# Patient Record
Sex: MALE | Race: WHITE | Employment: OTHER | URBAN - METROPOLITAN AREA
[De-identification: names, ages, dates, MRNs, and addresses within clinical notes are randomized per-mention and may not be internally consistent; named-entity substitution may affect disease eponyms.]

---

## 2019-04-15 ENCOUNTER — TELEPHONE (OUTPATIENT)
Dept: ORTHOPEDIC SURGERY | Age: 69
End: 2019-04-15

## 2019-04-15 ENCOUNTER — OFFICE VISIT (OUTPATIENT)
Dept: ORTHOPEDIC SURGERY | Age: 69
End: 2019-04-15
Payer: MEDICARE

## 2019-04-15 VITALS — BODY MASS INDEX: 29.95 KG/M2 | HEIGHT: 73 IN | WEIGHT: 226 LBS

## 2019-04-15 DIAGNOSIS — M17.12 PRIMARY OSTEOARTHRITIS OF LEFT KNEE: ICD-10-CM

## 2019-04-15 DIAGNOSIS — R52 PAIN: Primary | ICD-10-CM

## 2019-04-15 DIAGNOSIS — M17.11 OSTEOARTHRITIS OF RIGHT KNEE, UNSPECIFIED OSTEOARTHRITIS TYPE: ICD-10-CM

## 2019-04-15 PROCEDURE — 99213 OFFICE O/P EST LOW 20 MIN: CPT | Performed by: ORTHOPAEDIC SURGERY

## 2019-04-15 RX ORDER — ASPIRIN 325 MG
325 TABLET ORAL DAILY
COMMUNITY
End: 2021-11-11

## 2019-04-15 RX ORDER — MELOXICAM 15 MG/1
15 TABLET ORAL DAILY
Qty: 90 TABLET | Refills: 3 | Status: SHIPPED | OUTPATIENT
Start: 2019-04-15 | End: 2020-06-12

## 2019-04-15 NOTE — TELEPHONE ENCOUNTER
04/15/2019  EUFLEXXA  (SERIES OF 3)  RIGHT  KNEE. NO AUTHORIZATION REQUIRED. CAN BUY& BILL. PER MEDICARE GUIDELINES.  AP

## 2019-04-15 NOTE — PROGRESS NOTES
Chief Complaint    Knee Pain (L KNEE PAIN )      History of Present Illness:  Guy George is a 70 y.o. male. He has successfully had a right total knee arthroplasty and is here primarily for soreness and pain in his left knee. He has recently started playing pickle ball which has given his knee some soreness anterior and medial.  Pain level can be 7/10. Its described as a throbbing pain. It can last a few days and then quiet down. Rest and ice decreases the pain. Catching, or giving way. No night pain, rest pain, fever or chills. Pain Assessment  Location of Pain: Knee  Location Modifiers: Left  Severity of Pain: 7  Quality of Pain: Throbbing  Duration of Pain: A few days  Frequency of Pain: Intermittent  Aggravating Factors: Stairs, Exercise  Limiting Behavior: No  Relieving Factors: Rest  Work-Related Injury: No  Are there other pain locations you wish to document?: No    Medical History:  Patient's medications, allergies, past medical, surgical, social and family histories were reviewed and updated as appropriate. Review of Systems:  Pertinent items are noted in HPI  Review of systems reviewed from Patient History Form dated on April 15, 2019 and available in the patient's chart under the Media tab. Vital Signs:  Ht 6' 1\" (1.854 m)   Wt 226 lb (102.5 kg)   BMI 29.82 kg/m²     General Exam:   Constitutional: Patient is adequately groomed with no evidence of malnutrition  Mental Status: The patient is oriented to time, place and person. The patient's mood and affect are appropriate. Lymphatic: The lymphatic examination bilaterally reveals all areas to be without enlargement or induration. Knee Examination:    Inspection:  Genu varum left knee    Palpation:  Left knee has good ligamentous stability in all directions. Mild patellofemoral crepitus with moderate retropatellar and peripatellar medial facet tenderness.   He does have anteromedial joint line tenderness but no lateral joint line tenderness. Adolfo sign negative. Fullness in the popliteal region but no obvious masses. Range of Motion:  He comes pretty close to full extension but flexes only to about 100°    Strength:  Quadriceps strength 4/5 left    Special Tests:  Negative Homans sign left    Skin: There are no rashes, ulcerations or lesions. Gait: Mild antalgic gait left    Reflex intact    Additional Comments:       Additional Examinations:         Right Lower Extremity: Examination of the right lower extremity does not show any tenderness, deformity or injury. Range of motion is unremarkable. There is no gross instability. There are no rashes, ulcerations or lesions. Strength and tone are normal.  Left Lower Extremity: Examination of the left lower extremity does not show any tenderness, deformity or injury. Range of motion is unremarkable. There is no gross instability. There are no rashes, ulcerations or lesions. Strength and tone are normal.    Radiology:     X-rays obtained and reviewed in office:  Views standing AP, PA, lateral and sunrise  Location left knee  Impression advanced close to bone-on-bone medial compartment primary osteoarthritis with intermediate to advanced primary patellofemoral osteoarthritis left knee         Assessment :  Close to bone-on-bone medial compartment and advanced patellofemoral primary osteoarthritis left knee    Impression:  Encounter Diagnoses   Name Primary?  Pain Yes    Osteoarthritis of right knee, unspecified osteoarthritis type     Primary osteoarthritis of left knee        Office Procedures:  Orders Placed This Encounter   Procedures    XR KNEE LEFT (MIN 4 VIEWS)     Standing Status:   Future     Number of Occurrences:   1     Standing Expiration Date:   5/15/2019    EUFLEXXA INJ PER DOSE     Standing Status:   Future     Standing Expiration Date:   4/14/2020       Treatment Plan: We talked him at length about the options of treatment.   At this point trying another series of viscous supplementation which had worked in the past some years ago would be recommended. We've also recommended strengthening exercises. We recommended the use of ice. We recommended trying meloxicam 15 mg by mouth daily with food and obvious precautions. We will get permission for Visco supplementation go ahead and start that when he returns from his trip down [facility].

## 2019-04-17 ENCOUNTER — TELEPHONE (OUTPATIENT)
Dept: ORTHOPEDIC SURGERY | Age: 69
End: 2019-04-17

## 2019-04-17 NOTE — TELEPHONE ENCOUNTER
I left a message on the patients home machine to call and make an appointment for the approved knee injections.

## 2019-05-06 ENCOUNTER — OFFICE VISIT (OUTPATIENT)
Dept: ORTHOPEDIC SURGERY | Age: 69
End: 2019-05-06
Payer: MEDICARE

## 2019-05-06 DIAGNOSIS — M17.12 PRIMARY OSTEOARTHRITIS OF LEFT KNEE: Primary | ICD-10-CM

## 2019-05-06 PROCEDURE — 20610 DRAIN/INJ JOINT/BURSA W/O US: CPT | Performed by: ORTHOPAEDIC SURGERY

## 2019-05-06 NOTE — PROGRESS NOTES
The patient returns today for their first Euflexxa injection to the left knee for diagnosis of osteoarthritis. . The risks, benefits, and complications of the injections were again discussed in detail with the patient. The risks discussed included but are not limited to infection, skin reactions, hot swollen joints, and anaphylaxis. The patient gave verbal informed consent for the injection. The patient skin was prepped with iodine and sterile 4x4 gauze pad and the left knee joint was injected with 2 ml of Euflexxa intra-articularly under sterile conditions  into the supra-lateral pouch. The patient tolerated the injection reasonably well. The patient was given instructions to ice the left knee and avoid strenuous activities for 24-48 hours. The patient was instructed to call the office immediately if there is increased pain, redness, warmth, fever, or chills. We will see the patient back in [two weeks] for their second injection.

## 2019-05-13 ENCOUNTER — OFFICE VISIT (OUTPATIENT)
Dept: ORTHOPEDIC SURGERY | Age: 69
End: 2019-05-13
Payer: MEDICARE

## 2019-05-13 VITALS
DIASTOLIC BLOOD PRESSURE: 80 MMHG | HEIGHT: 73 IN | WEIGHT: 225.97 LBS | BODY MASS INDEX: 29.95 KG/M2 | HEART RATE: 76 BPM | SYSTOLIC BLOOD PRESSURE: 124 MMHG

## 2019-05-13 DIAGNOSIS — M25.562 CHRONIC PAIN OF LEFT KNEE: ICD-10-CM

## 2019-05-13 DIAGNOSIS — M17.12 PRIMARY OSTEOARTHRITIS OF LEFT KNEE: Primary | ICD-10-CM

## 2019-05-13 DIAGNOSIS — G89.29 CHRONIC PAIN OF LEFT KNEE: ICD-10-CM

## 2019-05-13 PROCEDURE — 20610 DRAIN/INJ JOINT/BURSA W/O US: CPT | Performed by: FAMILY MEDICINE

## 2019-05-13 PROCEDURE — 99999 PR OFFICE/OUTPT VISIT,PROCEDURE ONLY: CPT | Performed by: FAMILY MEDICINE

## 2019-05-13 RX ORDER — HYALURONATE SODIUM 10 MG/ML
20 SYRINGE (ML) INTRAARTICULAR ONCE
Status: COMPLETED | OUTPATIENT
Start: 2019-05-13 | End: 2019-05-13

## 2019-05-13 RX ADMIN — Medication 20 MG: at 09:46

## 2019-05-13 NOTE — PROGRESS NOTES
The patient returns today for their 2nd Euflexxa injection to the left knee for diagnosis of osteoarthritis. . The risks, benefits, and complications of the injections were again discussed in detail with the patient. The risks discussed included but are not limited to infection, skin reactions, hot swollen joints, and anaphylaxis. The patient gave verbal informed consent for the injection. The patient skin was prepped with iodine and sterile 4x4 gauze pad and the left knee joint was injected with 2 ml of Euflexxa intra-articularly under sterile conditions  into the supra-lateral pouch. The patient tolerated the injection reasonably well. The patient was given instructions to ice the left knee and avoid strenuous activities for 24-48 hours. The patient was instructed to call the office immediately if there is increased pain, redness, warmth, fever, or chills. We will see the patient back in [one weeks] for their final left knee Euflexxa injection.

## 2019-05-20 ENCOUNTER — OFFICE VISIT (OUTPATIENT)
Dept: ORTHOPEDIC SURGERY | Age: 69
End: 2019-05-20
Payer: MEDICARE

## 2019-05-20 DIAGNOSIS — M25.562 CHRONIC PAIN OF LEFT KNEE: ICD-10-CM

## 2019-05-20 DIAGNOSIS — G89.29 CHRONIC PAIN OF LEFT KNEE: ICD-10-CM

## 2019-05-20 DIAGNOSIS — M17.12 PRIMARY OSTEOARTHRITIS OF LEFT KNEE: Primary | ICD-10-CM

## 2019-05-20 PROCEDURE — 99999 PR OFFICE/OUTPT VISIT,PROCEDURE ONLY: CPT | Performed by: ORTHOPAEDIC SURGERY

## 2019-05-20 PROCEDURE — 20610 DRAIN/INJ JOINT/BURSA W/O US: CPT | Performed by: ORTHOPAEDIC SURGERY

## 2019-05-20 NOTE — PROGRESS NOTES
The patient returns today for their third Euflexxa injection to the left knee for diagnosis of osteoarthritis. . The risks, benefits, and complications of the injections were again discussed in detail with the patient. The risks discussed included but are not limited to infection, skin reactions, hot swollen joints, and anaphylaxis. The patient gave verbal informed consent for the injection. The patient skin was prepped with iodine and sterile 4x4 gauze pad and the left knee joint was injected with 2 ml of Euflexxa intra-articularly under sterile conditions  into the supra-lateral pouch. The patient tolerated the injection reasonably well. The patient was given instructions to ice the left knee and avoid strenuous activities for 24-48 hours. The patient was instructed to call the office immediately if there is increased pain, redness, warmth, fever, or chills. We will see the patient back in 3-months for a follow-up.

## 2019-05-20 NOTE — PROGRESS NOTES
Product Euflexxa    [facility]#[id number]     Lot# [id number]    Exp 4/14/2020    Site L KNEE

## 2020-06-12 ENCOUNTER — OFFICE VISIT (OUTPATIENT)
Dept: ORTHOPEDIC SURGERY | Age: 70
End: 2020-06-12
Payer: MEDICARE

## 2020-06-12 VITALS — BODY MASS INDEX: 29.16 KG/M2 | HEIGHT: 73 IN | WEIGHT: 220 LBS

## 2020-06-12 PROCEDURE — G8427 DOCREV CUR MEDS BY ELIG CLIN: HCPCS | Performed by: PHYSICAL MEDICINE & REHABILITATION

## 2020-06-12 PROCEDURE — 1123F ACP DISCUSS/DSCN MKR DOCD: CPT | Performed by: PHYSICAL MEDICINE & REHABILITATION

## 2020-06-12 PROCEDURE — 4040F PNEUMOC VAC/ADMIN/RCVD: CPT | Performed by: PHYSICAL MEDICINE & REHABILITATION

## 2020-06-12 PROCEDURE — 1036F TOBACCO NON-USER: CPT | Performed by: PHYSICAL MEDICINE & REHABILITATION

## 2020-06-12 PROCEDURE — G8417 CALC BMI ABV UP PARAM F/U: HCPCS | Performed by: PHYSICAL MEDICINE & REHABILITATION

## 2020-06-12 PROCEDURE — 99203 OFFICE O/P NEW LOW 30 MIN: CPT | Performed by: PHYSICAL MEDICINE & REHABILITATION

## 2020-06-12 PROCEDURE — 3017F COLORECTAL CA SCREEN DOC REV: CPT | Performed by: PHYSICAL MEDICINE & REHABILITATION

## 2020-06-12 PROCEDURE — 96372 THER/PROPH/DIAG INJ SC/IM: CPT | Performed by: PHYSICAL MEDICINE & REHABILITATION

## 2020-06-12 RX ORDER — TRAMADOL HYDROCHLORIDE 50 MG/1
50 TABLET ORAL 3 TIMES DAILY PRN
Qty: 21 TABLET | Refills: 0 | Status: SHIPPED | OUTPATIENT
Start: 2020-06-12 | End: 2020-06-19

## 2020-06-12 RX ORDER — KETOROLAC TROMETHAMINE 30 MG/ML
60 INJECTION, SOLUTION INTRAMUSCULAR; INTRAVENOUS ONCE
Status: COMPLETED | OUTPATIENT
Start: 2020-06-12 | End: 2020-06-12

## 2020-06-12 RX ORDER — ASPIRIN 325 MG
325 TABLET ORAL DAILY
Status: CANCELLED | OUTPATIENT
Start: 2020-06-12

## 2020-06-12 RX ADMIN — KETOROLAC TROMETHAMINE 60 MG: 30 INJECTION, SOLUTION INTRAMUSCULAR; INTRAVENOUS at 10:44

## 2020-06-12 NOTE — PROGRESS NOTES
New Patient: SPINE    CHIEF COMPLAINT:    Chief Complaint   Patient presents with    Back Pain     NP LBP requesting TEZ, last seen in 2014       HISTORY OF PRESENT ILLNESS:                The patient is a 78 y.o. male seen 2014. He has L3-4 central stenosis, L5-S1 spondylolisthesis. He had interlaminar epidural with good relief. Yesterday developed sudden onset axial back pain. No trauma. Occasional referred pain in his buttocks no pain down his legs. Worse with any movement. No bowel or bladder changes. In general over the years he is done well with his back. He feels this pain is the same that resolved from the last epidural injection    His interval medical history clues right total knee arthroplasty with Dr. Ramez Lee and chronic cough which is been thoroughly evaluated    Past Medical History:   Diagnosis Date    Arthritis      OSTEOARTHRITIS BILATERAL KNEES    Hypertension           Pain Assessment  Location of Pain: Back  Severity of Pain: 9  Quality of Pain: Aching  Duration of Pain: Persistent  Frequency of Pain: Constant  Aggravating Factors: Standing, Walking, Other (Comment)  Limiting Behavior: Yes  Relieving Factors: Rest  Result of Injury: No  Work-Related Injury: No  Are there other pain locations you wish to document?: No    The pain assessment was noted & reviewed in the medical record today.      Current/Past Treatment:   · Physical Therapy:   · Chiropractic:     · Injection:   2013 b/l L3 txesi; 9/23/13: L5/S1 LESI  7/22/14: Rt L5/S1 LESI  Medications:            NSAIDS:             Muscle relaxer:              Steriods:              Neuropathic medications:              Opioids: He took a tramadol this morning left over from 6 years ago with relief            Other:   · Surgery/Consult:    Work Status/Functionality:     Past Medical History: Medical history form was reviewed today & scanned into the media tab  Past Medical History:   Diagnosis Date    Arthritis      OSTEOARTHRITIS BILATERAL KNEES    Hypertension       Past Surgical History:     Past Surgical History:   Procedure Laterality Date    JOINT REPLACEMENT Right 11/3013    KNEE SURGERY      OTHER SURGICAL HISTORY      WIRED JAW SHUT    TONSILLECTOMY      VARICOCELECTOMY      VASECTOMY       Current Medications:     Current Outpatient Medications:     aspirin 325 MG tablet, Take 325 mg by mouth daily, Disp: , Rfl:   Allergies:  Codeine  Social History:    reports that he has never smoked. He has never used smokeless tobacco.  Family History:   No family history on file. REVIEW OF SYSTEMS: Full ROS noted & scanned   CONSTITUTIONAL: Denies unexplained weight loss, fevers, chills or fatigue  NEUROLOGICAL: Denies unsteady gait or progressive weakness  MUSCULOSKELETAL: Denies joint swelling or redness  PSYCHOLOGICAL: Denies anxiety, depression   SKIN: Denies skin changes, delayed healing, rash, itching   HEMATOLOGIC: Denies easy bleeding or bruising  ENDOCRINE: Denies excessive thirst, urination, heat/cold  RESPIRATORY: Denies current dyspnea, cough  GI: Denies nausea, vomiting, diarrhea   : Denies bowel or bladder issues       PHYSICAL EXAM:    Vitals: Height 6' 1\" (1.854 m), weight 220 lb (99.8 kg). GENERAL EXAM:  · General Apparence: Patient is adequately groomed with no evidence of malnutrition. · Orientation: The patient is oriented to time, place and person. · Mood & Affect:The patient's mood and affect are appropriate   · Vascular: Examination reveals no swelling tenderness in upper or lower extremities. Good capillary refill  · Lymphatic: The lymphatic examination bilaterally reveals all areas to be without enlargement or induration  · Sensation: Sensation is intact without deficit  · Coordination/Balance: Good coordination       LUMBAR/SACRAL EXAMINATION:  · Inspection: Stands forward flexed palpation:   No evidence of tenderness at the midline. No tenderness bilaterally at the paraspinal or trochanters.   There is no step-off or paraspinal spasm. · Range of Motion: Moderate loss of flexion and extension  · Strength:   Strength testing is 5/5 in all muscle groups tested. · Special Tests:   Straight leg raise and crossed SLR negative. Leg length and pelvis level.  0 out of 5 Edna's signs. · Skin: There are no rashes, ulcerations or lesions. · Reflexes: Reflexes are symmetrically 2+ at the patellar and ankle tendons. Clonus absent bilaterally at the feet. · Gait & station: Normal gait  · Additional Examinations:   · RIGHT LOWER EXTREMITY: Inspection/examination of the right lower extremity does not show any tenderness, deformity or injury. Range of motion is full. There is no gross instability. There are no rashes, ulcerations or lesions. Strength and tone are normal.  ·   · LEFT LOWER EXTREMITY:  Inspection/examination of the left lower extremity does not show any tenderness, deformity or injury. Range of motion is full. There is no gross instability. There are no rashes, ulcerations or lesions. Strength and tone are normal.    Diagnostic Testin/12/2022 views lumbar spine shows multilevel advanced discogenic spondylosis with retrolisthesis L2-3, anterior listhesis L5-S1 from spondylolytic spondylolisthesis    Impression:    Lumbar spondylo-stenosis and acute axial back pain        Plan:     Tramadol 1 3 times daily PRN  ORT  Toradol 60 mg IM (NDC# 74523-503-81) given into the Left gluteus alison, patient tolerated procedure well. Call next week and if still uncomfortable we will get an updated MRI.   I will review this on my own time and likely directly schedule midline L5-S1 PAULETTE, #1 new series    F Heidi Esters

## 2020-06-16 ENCOUNTER — HOSPITAL ENCOUNTER (OUTPATIENT)
Dept: MRI IMAGING | Age: 70
Discharge: HOME | End: 2020-06-16
Payer: MEDICARE

## 2020-06-16 PROCEDURE — 72148 MRI LUMBAR SPINE W/O DYE: CPT

## 2020-06-19 ENCOUNTER — TELEPHONE (OUTPATIENT)
Dept: ORTHOPEDIC SURGERY | Age: 70
End: 2020-06-19

## 2020-06-23 ENCOUNTER — HOSPITAL ENCOUNTER (OUTPATIENT)
Age: 70
Setting detail: OUTPATIENT SURGERY
Discharge: HOME | End: 2020-06-23
Attending: PHYSICAL MEDICINE & REHABILITATION | Admitting: PHYSICAL MEDICINE & REHABILITATION
Payer: MEDICARE

## 2020-06-23 VITALS
TEMPERATURE: 98.1 F | BODY MASS INDEX: 29.03 KG/M2 | DIASTOLIC BLOOD PRESSURE: 85 MMHG | RESPIRATION RATE: 16 BRPM | HEIGHT: 73 IN | SYSTOLIC BLOOD PRESSURE: 141 MMHG | OXYGEN SATURATION: 96 % | HEART RATE: 60 BPM

## 2020-06-23 PROCEDURE — 6360000002 HC RX W HCPCS: Performed by: PHYSICAL MEDICINE & REHABILITATION

## 2020-06-23 PROCEDURE — 3600000002 HC SURGERY LEVEL 2 BASE: Performed by: PHYSICAL MEDICINE & REHABILITATION

## 2020-06-23 PROCEDURE — 6360000004 HC RX CONTRAST MEDICATION: Performed by: PHYSICAL MEDICINE & REHABILITATION

## 2020-06-23 PROCEDURE — 2709999900 HC NON-CHARGEABLE SUPPLY: Performed by: PHYSICAL MEDICINE & REHABILITATION

## 2020-06-23 PROCEDURE — 2500000003 HC RX 250 WO HCPCS: Performed by: PHYSICAL MEDICINE & REHABILITATION

## 2020-06-23 PROCEDURE — 7100000010 HC PHASE II RECOVERY - FIRST 15 MIN: Performed by: PHYSICAL MEDICINE & REHABILITATION

## 2020-06-23 RX ORDER — METHYLPREDNISOLONE ACETATE 40 MG/ML
INJECTION, SUSPENSION INTRA-ARTICULAR; INTRALESIONAL; INTRAMUSCULAR; SOFT TISSUE
Status: DISCONTINUED
Start: 2020-06-23 | End: 2020-06-23 | Stop reason: HOSPADM

## 2020-06-23 RX ORDER — LIDOCAINE HYDROCHLORIDE 10 MG/ML
INJECTION, SOLUTION EPIDURAL; INFILTRATION; INTRACAUDAL; PERINEURAL PRN
Status: DISCONTINUED | OUTPATIENT
Start: 2020-06-23 | End: 2020-06-23 | Stop reason: ALTCHOICE

## 2020-06-23 ASSESSMENT — PAIN - FUNCTIONAL ASSESSMENT
PAIN_FUNCTIONAL_ASSESSMENT: 0-10
PAIN_FUNCTIONAL_ASSESSMENT: PREVENTS OR INTERFERES SOME ACTIVE ACTIVITIES AND ADLS

## 2020-06-23 ASSESSMENT — PAIN DESCRIPTION - DESCRIPTORS: DESCRIPTORS: SORE;TIGHTNESS

## 2020-06-23 ASSESSMENT — PAIN SCALES - GENERAL: PAINLEVEL_OUTOF10: 0

## 2020-06-23 NOTE — H&P
HISTORY AND PHYSICAL/PRE-SEDATION ASSESSMENT    Patient:  Jean Marie Sharp   :  1950  Medical Record No.:  [id number]   Date:  2020  Physician:  Lori Armenta M.D. Facility: [facility]     Nursing History and Physical reviewed and agreed upon. Additional findings:    Allergies:  Codeine    Home Medications:    Prior to Admission medications    Medication Sig Start Date End Date Taking? Authorizing Provider   aspirin 325 MG tablet Take 325 mg by mouth daily    Historical Provider, MD       Vitals: Stable     PHYSICAL EXAM:  HENT: Airway patent and reviewed  Cardiovascular: Normal rate, regular rhythm, normal heart sounds. Pulmonary/Chest: No wheezes. No rhonchi. No rales. Abdominal: Soft. Bowel sounds are normal. No distension. Mallampati: 2      MALLAMPATI:           []   I. Complete visualization of the soft palate           [x]   II. Complete visualization of the uvula            []   III. Visualization of only the base of the uvula           []   IV. Soft palate is not visible     ASA CLASS:         []   I. Normal, healthy adult           [x]   II.  Mild systemic disease            []   III. Severe systemic disease      Sedation plan:   [x]  Local              []  Minimal                  []  General anesthesia    Patient's condition acceptable for planned procedure/sedation. Post Procedure Plan   Return to same level of care   ______________________     The risks and benefits as well as alternatives to the procedure have been discussed with the patient and or family. The patient and or next of kin understands and agrees to proceed.     Lori Armenta M.D.

## 2020-06-25 NOTE — OP NOTE
Patient:  Isabella Villela Record #:  [id number]   Date:  6-  Physician:  Rachel Hernandez M.D. Facility: [facility]     Pre-op diagnosis: Lumbar spondylosis, lumbar radiculitis, lumbar stenosis  Post-op diagnosis:  same  Procedure: Midline L5/S1 interlaminar epidural injection #1 with flouroscopic guidance  Anesthesia: Local  Procedure Note:    The patient was admitted through pre-op and written consent was obtained. The patient was advised of the risks and benefits of the procedure, including but not limited to the following: bleeding, pain, infection, temporary paralysis, nerve damage and spinal headache. The patient was given the opportunity to ask questions. There were no contraindications for this procedure. The appropriate area was prepped and draped in a sterile fashion. Landmarks were identified and marked. The skin and soft tissues were anesthetized with 1% lidocaine. A 20G Touhy needle was advanced to the midline L5/S1 interlaminar space using fluoroscopic guidance with ideal needle tip placement confirmed by multiple views. Injection of contrast showed epidural flow. There were no signs of intravascular or intrathecal injection. 80 mg depomedrol and 1cc 1% lidocaine were then injected. There were no complications and the patient tolerated the procedure well. The patient was transferred to the recovery area and monitored. Discharge instructions were given. The patient is to contact me for any post-procedure concerns. The patient is to follow up as scheduled.     OBI: 6.5 cm     Estimated blood loss: none    JUSTIN Lomas MD

## 2020-07-09 ENCOUNTER — VIRTUAL VISIT (OUTPATIENT)
Dept: ORTHOPEDIC SURGERY | Age: 70
End: 2020-07-09
Payer: MEDICARE

## 2020-07-09 ENCOUNTER — TELEPHONE (OUTPATIENT)
Dept: ORTHOPEDIC SURGERY | Age: 70
End: 2020-07-09

## 2020-07-09 PROCEDURE — 99212 OFFICE O/P EST SF 10 MIN: CPT | Performed by: PHYSICIAN ASSISTANT

## 2020-07-09 PROCEDURE — G8427 DOCREV CUR MEDS BY ELIG CLIN: HCPCS | Performed by: PHYSICIAN ASSISTANT

## 2020-07-09 PROCEDURE — 3017F COLORECTAL CA SCREEN DOC REV: CPT | Performed by: PHYSICIAN ASSISTANT

## 2020-07-09 PROCEDURE — 4040F PNEUMOC VAC/ADMIN/RCVD: CPT | Performed by: PHYSICIAN ASSISTANT

## 2020-07-09 PROCEDURE — 1036F TOBACCO NON-USER: CPT | Performed by: PHYSICIAN ASSISTANT

## 2020-07-09 PROCEDURE — 1123F ACP DISCUSS/DSCN MKR DOCD: CPT | Performed by: PHYSICIAN ASSISTANT

## 2020-07-09 PROCEDURE — G8417 CALC BMI ABV UP PARAM F/U: HCPCS | Performed by: PHYSICIAN ASSISTANT

## 2020-07-09 NOTE — PROGRESS NOTES
Virtual Visit: PM&R SPINE    CHIEF COMPLAINT:    Chief Complaint   Patient presents with    Back Pain     F/U TEZ       The patient is being evaluated by a Virtual Visit (video visit) encounter due to the restrictions of the COVID-19 pandemic. A caregiver was present when appropriate. All issues as below were discussed and addressed, but no physical exam was performed unless allowed by visual confirmation. If it was felt that patient should be evaluated in clinic then they were directed there. Due to this being a TeleHealth encounter (During ETT-28 public health emergency), evaluation of the following organ systems was limited to: spine. Pursuant to the emergency declaration under the [address] [place] [address], [facility] waiver authority and the [employer] Act, this Virtual Visit was conducted with patient's verbal consent, to reduce the risk of exposure to COVID-19 and provide necessary medical care. The patient (and/or legal guardian) has also been advised to contact this office for worsening conditions or problems, and seek emergency medical treatment and/or call 911 if deemed necessary. Services were provided through a video synchronous discussion virtually to substitute for in-person encounter. Individuals present during telemedicine consultation-Comfort Paul, [facility]    HISTORY OF PRESENT ILLNESS:                The patient is a 78 y.o. male virtual visit follow up after M L5-S1 LESI #1 from 6- for a 1mo h/o atraumatic achy LB/buttock pain. Pain increased with walking/standing. Relief with resting. He currently reports 90+% improvement since the [facility] with improved functionality. He denies any distal radiating pain. No progressive N/T/W. No side effects to procedure. Overall much improved.      Past Medical History:   Diagnosis Date    Arthritis      OSTEOARTHRITIS BILATERAL KNEES    Hypertension       Current/Past Treatment:   · Physical Therapy:   · Chiropractic:     · Injection:     9/23/13: L5/S1 LESI  7/22/14: Rt L5/S1 LESI  IM Toradol   6/23/2020 Midline L5/S1 interlaminar epidural injection #1--90% improved  Medications:            NSAIDS:             Muscle relaxer:              Steriods:              Neuropathic medications:              Opioids: Tramadol 50mg I po TID PRN--discontinued            Other:   · Surgery/Consult:    Past Medical History: Medical history form was reviewed today & scanned into the media tab  Past Medical History:   Diagnosis Date    Arthritis      OSTEOARTHRITIS BILATERAL KNEES    Hypertension       Past Surgical History:     Past Surgical History:   Procedure Laterality Date    JOINT REPLACEMENT Right 11/3013    KNEE SURGERY      OTHER SURGICAL HISTORY      WIRED JAW SHUT    PAIN MANAGEMENT PROCEDURE N/A 6/23/2020    LUMBAR FIVE SACRAL ONE EPIDURAL STEROID INJECTION SITE CONFIRMED BY FLUOROSCOPY performed by Ramez Gonzales MD at [facility]      VARICOCELECTOMY      VASECTOMY       Current Medications:     Current Outpatient Medications:     aspirin 325 MG tablet, Take 325 mg by mouth daily, Disp: , Rfl:   Allergies:  Codeine  Social History:    reports that he has never smoked. He has never used smokeless tobacco.  Family History:   History reviewed. No pertinent family history. REVIEW OF SYSTEMS:   Patient's review of symptoms was reviewed and is significant for back pain and negative for recent unexplained weight loss, fatigue, current fever/chills, bowel or bladder dysfunction, chest pain, or shortness of breath. All other ROS were negative. PHYSICAL EXAM--limited to visual exam only  Vitals: There were no vitals taken for this visit. GENERAL EXAM:  · General Apparence: Patient is adequately groomed with no evidence of malnutrition. · Orientation: The patient is oriented to time, place and person.    · Mood & Affect:The patient's mood and affect are appropriate   · Coordination/Balance: Good coordination     LUMBAR/SACRAL EXAMINATION:         · Range of Motion: He is able to sit forward flex  · Gait & station: Not assessed, he is seated  · Additional Examinations:   · Strength: Hip flexion is at least antigravity  ·  Sensation is intact to lower extremities per patient      Diagnostic Testin Lumbar MRI scan and report reviewed showing severe central stenosis L2-3, L3-4        Impression:  1) Acute LB/buttock pain--90% improved  2) Severe stenosis L2-3, L3-4      Plan:   1) PT, flexion based--will mail   2) Discussed repeat LESI if needed, may call          Time spent during this visit - [address] [address] [facility]

## 2021-04-20 ENCOUNTER — OFFICE VISIT (OUTPATIENT)
Dept: ORTHOPEDIC SURGERY | Age: 71
End: 2021-04-20
Payer: MEDICARE

## 2021-04-20 VITALS — BODY MASS INDEX: 29.16 KG/M2 | HEIGHT: 73 IN | WEIGHT: 220 LBS

## 2021-04-20 DIAGNOSIS — M54.16 LUMBAR RADICULOPATHY: ICD-10-CM

## 2021-04-20 DIAGNOSIS — M48.061 SPINAL STENOSIS OF LUMBAR REGION, UNSPECIFIED WHETHER NEUROGENIC CLAUDICATION PRESENT: ICD-10-CM

## 2021-04-20 DIAGNOSIS — M51.36 DDD (DEGENERATIVE DISC DISEASE), LUMBAR: ICD-10-CM

## 2021-04-20 DIAGNOSIS — R52 PAIN: Primary | ICD-10-CM

## 2021-04-20 PROCEDURE — 1123F ACP DISCUSS/DSCN MKR DOCD: CPT | Performed by: PHYSICIAN ASSISTANT

## 2021-04-20 PROCEDURE — G8427 DOCREV CUR MEDS BY ELIG CLIN: HCPCS | Performed by: PHYSICIAN ASSISTANT

## 2021-04-20 PROCEDURE — 1036F TOBACCO NON-USER: CPT | Performed by: PHYSICIAN ASSISTANT

## 2021-04-20 PROCEDURE — 4040F PNEUMOC VAC/ADMIN/RCVD: CPT | Performed by: PHYSICIAN ASSISTANT

## 2021-04-20 PROCEDURE — 99214 OFFICE O/P EST MOD 30 MIN: CPT | Performed by: PHYSICIAN ASSISTANT

## 2021-04-20 PROCEDURE — G8417 CALC BMI ABV UP PARAM F/U: HCPCS | Performed by: PHYSICIAN ASSISTANT

## 2021-04-20 PROCEDURE — 3017F COLORECTAL CA SCREEN DOC REV: CPT | Performed by: PHYSICIAN ASSISTANT

## 2021-04-20 RX ORDER — METHYLPREDNISOLONE 4 MG/1
TABLET ORAL
Qty: 1 KIT | Refills: 0 | Status: SHIPPED | OUTPATIENT
Start: 2021-04-20 | End: 2021-04-26

## 2021-04-20 RX ORDER — PREDNISONE 10 MG/1
TABLET ORAL
Qty: 21 TABLET | Refills: 0 | Status: SHIPPED | OUTPATIENT
Start: 2021-04-20 | End: 2021-11-11 | Stop reason: ALTCHOICE

## 2021-04-20 NOTE — PROGRESS NOTES
New Patient: SPINE    4/20/2021     CHIEF COMPLAINT:    Chief Complaint   Patient presents with    Back Pain     Patient states that he woke up this morning and complains of low back/right hip pain. No groin pain. Had trouble walking. HISTORY OF PRESENT ILLNESS:              The patient is a 69 y.o. male presents to the after-hours clinic with a new problem. Patient is here with a chief complaint of lumbar pain and right buttocks pain. Patient has had pain since this morning. Yesterday he did play [employer] ball and does do a large amount of kayaking. He does have a significant past medical history for multiple problems with his lumbar spine including stenosis. He has had multiple epidural injections. He denies any groin pain. He denies any weakness and bowel or bladder dysfunction. Past Medical History:   Diagnosis Date    Arthritis      OSTEOARTHRITIS BILATERAL KNEES    Hypertension           Pain Assessment  Location of Pain: Back  Location Modifiers: Right  Severity of Pain: 5  Quality of Pain: Dull, Aching  Duration of Pain: Persistent  Frequency of Pain: Constant  Aggravating Factors: Walking, Standing  Limiting Behavior: Yes  Relieving Factors: Rest  Result of Injury: No  Work-Related Injury: No  Are there other pain locations you wish to document?: No    The pain assessment was noted & reviewed in the medical record today.      Work Status/Functionality:     Past Medical History: Medical history form was reviewed today & scanned into the media tab  Past Medical History:   Diagnosis Date    Arthritis      OSTEOARTHRITIS BILATERAL KNEES    Hypertension       Past Surgical History:     Past Surgical History:   Procedure Laterality Date    JOINT REPLACEMENT Right 11/3013    KNEE SURGERY      OTHER SURGICAL HISTORY      WIRED JAW SHUT    PAIN MANAGEMENT PROCEDURE N/A 6/23/2020    LUMBAR FIVE SACRAL ONE EPIDURAL STEROID INJECTION SITE CONFIRMED BY FLUOROSCOPY performed by Harriet Damico Kylah Diaz MD at [facility] VARICOCELECTOMY      VASECTOMY       Current Medications:     Current Outpatient Medications:     aspirin 325 MG tablet, Take 325 mg by mouth daily, Disp: , Rfl:   Allergies:  Codeine  Social History:    reports that he has never smoked. He has never used smokeless tobacco.  Family History:   History reviewed. No pertinent family history. REVIEW OF SYSTEMS: Full ROS reviewed & scanned into chart  CONSTITUTIONAL: Denies unexplained weight loss, fevers   SKIN: Denies skin conditions, skin cancer  ENT: Denies Headaches, dizziness, nosebleeds  RESPIRATORY: Denies current dyspnea, difficulty breathing  CARDIOVASCULAR: Denies chest pain, dyspnea  NEUROLOGICAL: Denies stroke, unsteady gait or progressive weakness  PSYCHOLOGICAL: Denies anxiety, depression   HEMATOLOGIC: Denies blood disorders, cancer  ENDOCRINE: Denies excessive thirst, urination, heat/cold  GI: Denies ulcer, nausea, vomiting, diarrhea   : Denies bowel or bladder incontinence       PHYSICAL EXAM:    Vitals: Height 6' 1\" (1.854 m), weight 220 lb (99.8 kg). GENERAL EXAM:  · General Apparence: Patient is adequately groomed with no evidence of malnutrition. · Orientation: The patient is oriented to time, place and person. · Mood & Affect:The patient's mood and affect are appropriate   · Vascular: Examination reveals no swelling tenderness in upper or lower extremities. Good capillary refill  · Lymphatic: The lymphatic examination bilaterally reveals all areas to be without enlargement or induration  · Sensation: Sensation is intact without deficit  · Coordination/Balance: Good coordination     LUMBAR/SACRAL EXAMINATION:  · Inspection: Local inspection shows no step-off or bruising. Lumbar alignment is normal.  Sagittal and Coronal balance is neutral.      · Palpation:   No evidence of tenderness at the midline. No tenderness bilaterally at the paraspinal or trochanters.   There is no step-off or paraspinal spasm. · Range of Motion: Decreased range of motion with lumbar extension  · Strength:   Strength testing is 5/5 in all muscle groups tested. · Special Tests:   Straight leg raise and crossed SLR negative. Leg length and pelvis level.  0 out of 5 Edna's signs. · Skin: There are no rashes, ulcerations or lesions. · Reflexes: Reflexes are symmetrically 2+ at the patellar and ankle tendons. Clonus absent bilaterally at the feet. · Gait & station: Normal  · Additional Examinations:   · RIGHT LOWER EXTREMITY: Inspection/examination of the right lower extremity does not show any tenderness, deformity or injury. Range of motion is full. There is no gross instability. There are no rashes, ulcerations or lesions. Strength and tone are normal.  · Further physical exam of the right hip demonstrates negative logroll test.  No pain with hip flexion and internal or external rotation. Negative impingement signs. · LEFT LOWER EXTREMITY:  Inspection/examination of the left lower extremity does not show any tenderness, deformity or injury. Range of motion is full. There is no gross instability. There are no rashes, ulcerations or lesions. Strength and tone are normal.    Diagnostic Testing: X-rays were ordered today and reviewed of the lumbar spine. 2 views. AP and lateral views. There is significant lumbar degenerative disc disease facet arthrosis and a grade 1 spondylolisthesis of L5 on S1.  AP of the lumbar spine we are able to see a limited amount of his hip. He does have at least moderate right hip osteoarthritis. Impression: Lumbar degenerative disc disease, spondylolisthesis, arthrosis, and radiculopathy            Plan: Patient symptoms are coming from his lumbar spine. He has no pain in his hip with exam today. Would recommend a 20-10-Medrol prednisone taper. Restart his home exercises.   Follow-up with spine specialist.  At some point he may benefit from an

## 2021-07-22 ENCOUNTER — OFFICE VISIT (OUTPATIENT)
Dept: ORTHOPEDIC SURGERY | Age: 71
End: 2021-07-22
Payer: MEDICARE

## 2021-07-22 VITALS — WEIGHT: 220 LBS | BODY MASS INDEX: 29.16 KG/M2 | HEIGHT: 73 IN

## 2021-07-22 DIAGNOSIS — M25.562 ACUTE PAIN OF LEFT KNEE: ICD-10-CM

## 2021-07-22 DIAGNOSIS — M17.12 PRIMARY OSTEOARTHRITIS OF LEFT KNEE: Primary | ICD-10-CM

## 2021-07-22 PROCEDURE — 99213 OFFICE O/P EST LOW 20 MIN: CPT | Performed by: ORTHOPAEDIC SURGERY

## 2021-07-22 NOTE — PROGRESS NOTES
ORTHOPAEDIC SURGERY H&P / CONSULTATION NOTE    Chief complaint:   Chief Complaint   Patient presents with    Knee Pain     left knee pain, ongoing 5 years, sharp pain         History of present illness: The patient is a 69 y.o. male with subjective symptoms of left knee pain. The chief complaint is located at anterior and medial aspect left knee. Duration of symptoms has been for greater than 5 years. The severity of symptoms is rated at 5/10 pain on intake form. Patient states that he has had anterior medial sided knee pain for quite some time. He has had a previous right total knee replacement several years ago and has been very pleased with the overall results. He states similar symptoms in the left knee. Denies necessarily catching in locking however does note the knee occasionally has pain which cause it to get away. No real injury of recent per report. He states sharp pain in addition a dull throbbing aching pain. She has incorporated home exercise program as previously instructed him after right knee replacement. He has done this for the left knee. He is also taking anti-inflammatories. The patient has tried the below listed items prior to today's consultation for above listed chief complaint.     +   Over-the-counter anti-inflammatories/prescription medication anti-inflammatory.       + Physical therapy / guided home exercise program +     -   Previous corticosteroid injections    Past medical history:    Past Medical History:   Diagnosis Date    Arthritis      OSTEOARTHRITIS BILATERAL KNEES    Hypertension         Past surgical history:    Past Surgical History:   Procedure Laterality Date    JOINT REPLACEMENT Right 11/3013    KNEE SURGERY      OTHER SURGICAL HISTORY      WIRED JAW SHUT    PAIN MANAGEMENT PROCEDURE N/A 6/23/2020    LUMBAR FIVE SACRAL ONE EPIDURAL STEROID INJECTION SITE CONFIRMED BY FLUOROSCOPY performed by Elyse Hogan MD at [facility] VARICOCELECTOMY      VASECTOMY          Allergies: Allergies   Allergen Reactions    Codeine      Unsure of rxn         Medications:   Current Outpatient Medications:     aspirin 325 MG tablet, Take 325 mg by mouth daily, Disp: , Rfl:     predniSONE (DELTASONE) 10 MG tablet, 1 pill 2x/day for 7 days, 1 pill 1x/day for 7 days, then medrol dose pack (Patient not taking: Reported on 7/22/2021), Disp: 21 tablet, Rfl: 0     Social history: Denies IV drug use. Social History     Socioeconomic History    Marital status:      Spouse name: Not on file    Number of children: Not on file    Years of education: Not on file    Highest education level: Not on file   Occupational History    Not on file   Tobacco Use    Smoking status: Never Smoker    Smokeless tobacco: Never Used   Substance and Sexual Activity    Alcohol use: Not on file    Drug use: Never    Sexual activity: Not on file   Other Topics Concern    Not on file   Social History Narrative    Not on file     Social Determinants of Health     Financial Resource Strain:     Difficulty of Paying Living Expenses:    Food Insecurity:     Worried About Running Out of Food in the Last Year:     [address] in the Last Year:    Transportation Needs:     Lack of Transportation (Medical):  Lack of Transportation (Non-Medical):    Physical Activity:     Days of Exercise per Week:     Minutes of Exercise per Session:    Stress:     Feeling of Stress :    Social Connections:     Frequency of Communication with Friends and Family:     Frequency of Social Gatherings with Friends and Family:     Attends [employer] Services:     Active Member of Clubs or Organizations:     Attends Club or Organization Meetings:     Marital Status:    Intimate Partner Violence:     Fear of Current or Ex-Partner:     Emotionally Abused:     Physically Abused:     Sexually Abused: Tobacco use.     Social History     Tobacco Use   Smoking Status Never Smoker   Smokeless Tobacco Never Used     Employment: Noncontributory    Workers compensation claim: Noncontributory    Review of systems: Patient denies any fevers chills chest pain shortness of breath nausea vomiting significant weight loss any change in voiding or bowel movements. Patient denies any significant numbness or tingling at baseline as it relates to this presenting symptom/chief complaint. The patient denies any significant problems with skin or any significant allergies. Physical examination:  Body mass index is 29.03 kg/m². AAOx3, NCAT  EOMI  MMM  RR  Unlabored breathing, no wheezing  Skin intact BUE and BLE, warm and moist  Bilateral lower extremity examination specific to subjective symptoms  Well-healed surgical incision right knee  Exam Left Lower Extremity    Trace effusion,      8/112/0 active ROM (E/F/Lag), same P assive ROM (E/F/Lag), negative anterior Drawer, 1A Lachman,   negative posterior Drawer,  Stable varus/valgus at 0 and 30?,    none TTP Joint Line, negative Adolfo,   positive medial/lateral bone joint line tenderness. Skin intact throughout  5/5 IP Q H TA G EHL  SILT DP SP LP MP S S  +2 DP pulse    Diagnostic imaging:  MY READ:  Radiographs 4 view left knee 7/22/2021: Negative fracture. Tricompartmental arthrosis most pronounced medial patellofemoral    Pertinent lab work: None     Diagnosis Orders   1. Primary osteoarthritis of left knee     2. Acute pain of left knee  XR KNEE LEFT (MIN 4 VIEWS)       Assessment and plan: 69 y.o. male with current subjective symptoms and physical exam findings with diagnostic imaging correlating to left knee osteoarthritis.   -Greater than 50% of the time (16/30 minutes) was spent coordinating and discussing the clinical findings and diagnostic imaging results as they pertain to the patient's presenting subjective symptoms.  -I reviewed with the patient the imaging findings as well as clinical exam and  how it correlates to subjective symptoms.  -I had a pleasant discussion with the patient today. I reviewed with him consideration for continued conservative care to include formal physical therapy and also corticosteroid injection intra-articular space left knee. I did review with him consideration for a left total knee arthroplasty as he has failed conservative care treatment options thus far with activity modification, anti-inflammatories, and home exercise program.  Currently the patient wishes to pursue surgery and forego any conservative care treatment options. He does however wait wish until the fall sometime although I had offered him the date as early as in the next 2 to 3 weeks. He states recent dental cleaning which is adequate without any further dental procedures that need to be performed per his report  -He would require medical clearances  -He will contact the office when he is ready to schedule roughly 1 month out to ensure adequate viability  [employer] authorization is to be submitted  -All questions answered to the patient's satisfaction and the patient expressed understanding and agreement with the above listed treatment plan  -Follow up preoperatively for consent completion. I did review with him the perioperative course and what will entail. He expressed understanding with all questions answered  -Thank you for the clinical consultation and allowing me to participate in the patient's care. Electronically signed by Darnell Morin MD on 7/22/21 at 6:01 PM TREY Morin MD       Orthopaedic Surgery-Sports Medicine        Disclaimer: This note was dictated with voice recognition software. Though review and correction are routinely performed, please contact the office/medical records for any errors requiring correction.

## 2021-08-20 ENCOUNTER — TELEPHONE (OUTPATIENT)
Dept: ORTHOPEDIC SURGERY | Age: 71
End: 2021-08-20

## 2021-08-20 NOTE — TELEPHONE ENCOUNTER
Surgery and/or Procedure Scheduling     Contact Name: Phuong Nunes Request: LEFT KNEE REPLACEMENT  Patient Contact Number:   [phone]    PATIENT WOULD LIKE TO SCHEDULE SURGERY November 16, 2021.

## 2021-10-18 ENCOUNTER — TELEPHONE (OUTPATIENT)
Dept: ORTHOPEDIC SURGERY | Age: 71
End: 2021-10-18

## 2021-10-18 NOTE — TELEPHONE ENCOUNTER
General Question     Subject: QUESTIONS ABOUT SX PACKET FOR UPCOMING [address]   Patient and /or Facility Request: PATIENT   Contact Number: [phone]

## 2021-10-19 DIAGNOSIS — M17.12 PRIMARY OSTEOARTHRITIS OF LEFT KNEE: Primary | ICD-10-CM

## 2021-10-19 NOTE — TELEPHONE ENCOUNTER
Contacted patient about his surgery. He is all set for November 16th. Will send his packet via email.

## 2021-10-19 NOTE — PROGRESS NOTES
Margert Heady    Age 66 y.o.    male    1950    MRN [id number]    11/16/2021  Arrival Time_____________  OR Time____________[address]     Procedure(s):  LEFT TOTAL KNEE ARTHROPLASTY                      Spinal     Surgeon(s):  Angelica Herron, MD      DAY ADMIT ___  SDS/OP ___  OUTPT IN BED ___         Phone [phone] (home) [phone] (work)   PCP _____________________ Phone_________________ [address] ( ) Epic CE ( ) Appt ________    ADDITIONAL INFO __________________________________ Cardio/Consult _____________    NOTES _____________________________________________________________________    ____________________________________________________________________________    PAT APPT DATE:________ TIME: ________  FAXED QAD: _______  (__) H&P w/ hospitalist  ____________________________________________________________________________    COVID TEST: Date/Location______________        NURSING HISTORY COMPLETE: _______  (__) CBC       (__) W/ DIFF ___________  (__)  ECHO    __________  (__) Hgb A1C    ___________  (__) CHEST X RAY   __________  (__) LIPID PROFILE  ___________  (__) EKG   __________  (__) PT/PTT   ___________  (__) PFT's   __________  (__) BMP   ___________  (__) CAROTIDS  __________  (__) CMP   ___________  (__) VEIN MAPPING  __________  (__) U/A   ___________  (__) HISTORY & PHYSICAL __________  (__) URINE C & S  ___________  (__) CARDIAC CLEARANCE __________  (__) U/A W/ FLEX  ___________  (__) PULM.  CLEARANCE __________  (__) SERUM PREGNANCY ___________  (__) Check Epic DOS orders __________  (__) TYPE & SCREEN ________ repeat ( ) (__)  __________________ __________  (__) ALBUMIN   ___________  (__)  __________________ __________  (__) TRANSFERRIN  ___________  (__)  __________________ __________  (__) LIVER PROFILE  ___________  (__)  __________________ __________  (__) CARBOXY HGB  ___________  (__) URINE PREG DOS __________  (__) NICOTINE & MET.  ___________  (__) BLOOD SUGAR DOS __________  (__) PREALBUMIN  ___________    (__) MRSA NASAL SWAB ___________  (__) BLOOD THINNERS __________  (__) ACE/ ARBS: _____________________    (__) BETABLOCKERS ___________________

## 2021-10-27 ENCOUNTER — TELEPHONE (OUTPATIENT)
Dept: ORTHOPEDIC SURGERY | Age: 71
End: 2021-10-27

## 2021-11-09 ENCOUNTER — TELEPHONE (OUTPATIENT)
Dept: ORTHOPEDIC SURGERY | Age: 71
End: 2021-11-09

## 2021-11-09 DIAGNOSIS — M17.12 PRIMARY OSTEOARTHRITIS OF LEFT KNEE: Primary | ICD-10-CM

## 2021-11-09 NOTE — TELEPHONE ENCOUNTER
COVID:    Orthopedic Nurse Navigator Summary  -  Patient Name: Catherine Flannery  Anticipated Date of Surgery:11/16/21  Using [employer]? Yes, Are they Registered: Yes  Attended Pre-Op Education Class: No  If No, why not? PCP:  Phone #:  Date of PCP Visit for H&P: 10/26/2021  Any Noted Concerns from PCP prior to surgery: No  If Yes, what concerns?:  Is the Patient in a Pain Management Program?: No  Review of Past Medical History Reveals History of:  -  Critical Lab Values:  - Hemoglobin (g/dL): Date Value  - Hematocrit (%): Date Value  - HgbA1C : Date Value  - Albumin : Date Value  - BUN (mg/dL) : Date Value  - CREATININE (mg/dL) : Date Value  - BMI (kg/m2) : Date Value  -  Coronary Artery Disease/HTN/CHF History: Yes  Cardiologist:  Cardiac Clearance Necessary: No  Date of Cardiac Clearance Appt: On Plavix? No  If YES, when will they stop taking? Final Cardiac Recommendations: On any anticoagulation: No  -  Diabetes History: No  Most Recent HgbA1C:  PCP or Endocrine Recommendations:  Nutritionist/Dietician Consult Scheduled:  Final Plan For Diabetic Control:  Pulmonary: COPD/Emphysema/ Use of home oxygen: NONE  Alcohol use: Non-Drinker  -  DVT Risk Stratification: Low Risk  Vascular Consult Ordered:  Date of Vascular Appt:  Hematology/Oncology Consult Ordered:  Date of Hematology/Oncology Appt:  Final Recommendation For DVT Prophylaxis:  Smoking history: Non-Smoker  Use of Estrogen:  -  BMI Greater than 40 at time of scheduling?: No  Has Surgeon been notified of BMI concern? No  Weight Loss Clinic Consult Ordered No  Date of Wt Loss Clinic Appt:  BMI at time of surgery (if went through [facility]):  -  Additional Medical Concerns: Additional Recommendations for above concerns:  Attended Pre-Hab Program: No  Anticipated Discharge Disposition: D/C home with OP PT  Who will be with patient at home following discharge?  Wife  Equipment patient already has: Cane and crutches  Bedroom on first or second floor: First  Bathroom on first or second floor: first  Weight bearing status: full  Pre-op ambulatory status:  Number of entry steps: 2 Steps  Caregiver assistance: full time  -Pt will do OP PT at [facility] 11/19/21    Rasheeda  11/09/2021

## 2021-11-10 ENCOUNTER — TELEPHONE (OUTPATIENT)
Dept: ORTHOPEDIC SURGERY | Age: 71
End: 2021-11-10

## 2021-11-11 ENCOUNTER — ANESTHESIA EVENT (OUTPATIENT)
Dept: OPERATING ROOM | Age: 71
End: 2021-11-11
Payer: MEDICARE

## 2021-11-11 NOTE — PROGRESS NOTES
Preoperative Screening for Elective Surgery/Invasive Procedures While COVID-19 present in the community     Have you had any of the following symptoms? o Fever, chills  o Cough  o Shortness of breath  o Muscle aches/pain  o Diarrhea  o Abdominal pain, nausea, vomiting  o Loss or decrease in taste and / or smell   Risk of Exposure  o Have you recently been hospitalized for COVID-19 or flu-like illness, if so when?  o Recently diagnosed with COVID-19, if so when?  o Recently tested for COVID-19, if so when?  o Have you been in close contact with a person or family member who currently has or recently had COVID-19? If yes, when and in what context?  o Do you live with anybody who in the last 14 days has had fever, chills, shortness of breath, muscle aches, flu-like illness?  o Do you have any close contacts or family members who are currently in the hospital for COVID-19 or flu-like illness? If yes, assess recent close contact with this person. Indicate if the patient has a positive screen by answering yes to one or more of the above questions. Patients who test positive or screen positive prior to surgery or on the day of surgery should be evaluated in conjunction with the surgeon/proceduralist/anesthesiologist to determine the urgency of the procedure.      Pt had chills after booster given; denies all other symptoms

## 2021-11-11 NOTE — PROGRESS NOTES
Obstructive Sleep Apnea (JENN) Screening     Patient:  Shima Kim    YOB: 1950      Medical Record #:  [id number]                     Date:  11/11/2021     1. Are you a loud and/or regular snorer? [x]  Yes       [] No    2. Have you been observed to gasp or stop breathing during sleep? []  Yes       [x] No    3. Do you feel tired or groggy upon awakening or do you awaken with a headache?           []  Yes       [x] No    4. Are you often tired or fatigued during the wake time hours? []  Yes       [x] No    5. Do you fall asleep sitting, reading, watching TV or driving? []  Yes       [x] No    6. Do you often have problems with memory or concentration? []  Yes       [x] No    If patient's JENN score if greater than or equal to 3, they are considered high risk for JENN. An anesthesia provider will evaluate the patient and develop a plan of care the day of surgery. Note:  If the patient's BMI is more than 35 kg m¯² , has neck circumference > 40 cm, and/or high blood pressure the risk is greater (© American Sleep Apnea Association, 2006).

## 2021-11-16 ENCOUNTER — HOSPITAL ENCOUNTER (OUTPATIENT)
Age: 71
Setting detail: OBSERVATION
Discharge: HOME | End: 2021-11-17
Attending: ORTHOPAEDIC SURGERY | Admitting: ORTHOPAEDIC SURGERY
Payer: MEDICARE

## 2021-11-16 ENCOUNTER — ANESTHESIA (OUTPATIENT)
Dept: OPERATING ROOM | Age: 71
End: 2021-11-16
Payer: MEDICARE

## 2021-11-16 ENCOUNTER — APPOINTMENT (OUTPATIENT)
Dept: GENERAL RADIOLOGY | Age: 71
End: 2021-11-16
Attending: ORTHOPAEDIC SURGERY
Payer: MEDICARE

## 2021-11-16 VITALS — SYSTOLIC BLOOD PRESSURE: 111 MMHG | DIASTOLIC BLOOD PRESSURE: 62 MMHG | TEMPERATURE: 96 F | OXYGEN SATURATION: 97 %

## 2021-11-16 DIAGNOSIS — Z96.652 S/P TOTAL KNEE ARTHROPLASTY, LEFT: Primary | ICD-10-CM

## 2021-11-16 PROBLEM — M17.12 LOCALIZED OSTEOARTHRITIS OF LEFT KNEE: Status: ACTIVE | Noted: 2021-11-16

## 2021-11-16 LAB
ABO/RH: NORMAL
ANION GAP SERPL CALCULATED.3IONS-SCNC: 13 MMOL/L (ref 3–16)
ANTIBODY SCREEN: NORMAL
APTT: 33.9 SEC (ref 26.2–38.6)
BUN BLDV-MCNC: 13 MG/DL (ref 7–20)
CALCIUM SERPL-MCNC: 9.2 MG/DL (ref 8.3–10.6)
CHLORIDE BLD-SCNC: 103 MMOL/L (ref 99–110)
CO2: 24 MMOL/L (ref 21–32)
CREAT SERPL-MCNC: 0.8 MG/DL (ref 0.8–1.3)
EKG ATRIAL RATE: 58 BPM
EKG DIAGNOSIS: NORMAL
EKG P AXIS: 1 DEGREES
EKG P-R INTERVAL: 194 MS
EKG Q-T INTERVAL: 420 MS
EKG QRS DURATION: 74 MS
EKG QTC CALCULATION (BAZETT): 412 MS
EKG R AXIS: -16 DEGREES
EKG T AXIS: -15 DEGREES
EKG VENTRICULAR RATE: 58 BPM
GFR AFRICAN AMERICAN: >60
GFR NON-AFRICAN AMERICAN: >60
GLUCOSE BLD-MCNC: 99 MG/DL (ref 70–99)
HCT VFR BLD CALC: 42.1 % (ref 40.5–52.5)
HEMOGLOBIN: 14.6 G/DL (ref 13.5–17.5)
INR BLD: 0.96 (ref 0.88–1.12)
MCH RBC QN AUTO: 32.4 PG (ref 26–34)
MCHC RBC AUTO-ENTMCNC: 34.7 G/DL (ref 31–36)
MCV RBC AUTO: 93.4 FL (ref 80–100)
PDW BLD-RTO: 12.7 % (ref 12.4–15.4)
PLATELET # BLD: 236 K/UL (ref 135–450)
PMV BLD AUTO: 7.4 FL (ref 5–10.5)
POTASSIUM REFLEX MAGNESIUM: 4.7 MMOL/L (ref 3.5–5.1)
PROTHROMBIN TIME: 10.8 SEC (ref 9.9–12.7)
RBC # BLD: 4.51 M/UL (ref 4.2–5.9)
SODIUM BLD-SCNC: 140 MMOL/L (ref 136–145)
WBC # BLD: 5.7 K/UL (ref 4–11)

## 2021-11-16 PROCEDURE — 2580000003 HC RX 258: Performed by: ANESTHESIOLOGY

## 2021-11-16 PROCEDURE — 6370000000 HC RX 637 (ALT 250 FOR IP): Performed by: ORTHOPAEDIC SURGERY

## 2021-11-16 PROCEDURE — 97530 THERAPEUTIC ACTIVITIES: CPT

## 2021-11-16 PROCEDURE — 2720000010 HC SURG SUPPLY STERILE: Performed by: ORTHOPAEDIC SURGERY

## 2021-11-16 PROCEDURE — 3700000000 HC ANESTHESIA ATTENDED CARE: Performed by: ORTHOPAEDIC SURGERY

## 2021-11-16 PROCEDURE — 7100000000 HC PACU RECOVERY - FIRST 15 MIN: Performed by: ORTHOPAEDIC SURGERY

## 2021-11-16 PROCEDURE — 2500000003 HC RX 250 WO HCPCS: Performed by: ORTHOPAEDIC SURGERY

## 2021-11-16 PROCEDURE — 93005 ELECTROCARDIOGRAM TRACING: CPT | Performed by: ANESTHESIOLOGY

## 2021-11-16 PROCEDURE — 73560 X-RAY EXAM OF KNEE 1 OR 2: CPT

## 2021-11-16 PROCEDURE — 86900 BLOOD TYPING SEROLOGIC ABO: CPT

## 2021-11-16 PROCEDURE — 6360000002 HC RX W HCPCS

## 2021-11-16 PROCEDURE — 3600000004 HC SURGERY LEVEL 4 BASE: Performed by: ORTHOPAEDIC SURGERY

## 2021-11-16 PROCEDURE — 85730 THROMBOPLASTIN TIME PARTIAL: CPT

## 2021-11-16 PROCEDURE — 2580000003 HC RX 258: Performed by: ORTHOPAEDIC SURGERY

## 2021-11-16 PROCEDURE — 85610 PROTHROMBIN TIME: CPT

## 2021-11-16 PROCEDURE — 97110 THERAPEUTIC EXERCISES: CPT

## 2021-11-16 PROCEDURE — 3700000001 HC ADD 15 MINUTES (ANESTHESIA): Performed by: ORTHOPAEDIC SURGERY

## 2021-11-16 PROCEDURE — 2500000003 HC RX 250 WO HCPCS: Performed by: ANESTHESIOLOGY

## 2021-11-16 PROCEDURE — 2500000003 HC RX 250 WO HCPCS

## 2021-11-16 PROCEDURE — 6360000002 HC RX W HCPCS: Performed by: ORTHOPAEDIC SURGERY

## 2021-11-16 PROCEDURE — 85027 COMPLETE CBC AUTOMATED: CPT

## 2021-11-16 PROCEDURE — C1713 ANCHOR/SCREW BN/BN,TIS/BN: HCPCS | Performed by: ORTHOPAEDIC SURGERY

## 2021-11-16 PROCEDURE — 97162 PT EVAL MOD COMPLEX 30 MIN: CPT

## 2021-11-16 PROCEDURE — C9290 INJ, BUPIVACAINE LIPOSOME: HCPCS | Performed by: ORTHOPAEDIC SURGERY

## 2021-11-16 PROCEDURE — 86901 BLOOD TYPING SEROLOGIC RH(D): CPT

## 2021-11-16 PROCEDURE — 2709999900 HC NON-CHARGEABLE SUPPLY: Performed by: ORTHOPAEDIC SURGERY

## 2021-11-16 PROCEDURE — G0378 HOSPITAL OBSERVATION PER HR: HCPCS

## 2021-11-16 PROCEDURE — 93010 ELECTROCARDIOGRAM REPORT: CPT | Performed by: INTERNAL MEDICINE

## 2021-11-16 PROCEDURE — 6360000002 HC RX W HCPCS: Performed by: ANESTHESIOLOGY

## 2021-11-16 PROCEDURE — 27447 TOTAL KNEE ARTHROPLASTY: CPT | Performed by: ORTHOPAEDIC SURGERY

## 2021-11-16 PROCEDURE — 86850 RBC ANTIBODY SCREEN: CPT

## 2021-11-16 PROCEDURE — C1776 JOINT DEVICE (IMPLANTABLE): HCPCS | Performed by: ORTHOPAEDIC SURGERY

## 2021-11-16 PROCEDURE — 6370000000 HC RX 637 (ALT 250 FOR IP): Performed by: ANESTHESIOLOGY

## 2021-11-16 PROCEDURE — 64447 NJX AA&/STRD FEMORAL NRV IMG: CPT | Performed by: ANESTHESIOLOGY

## 2021-11-16 PROCEDURE — 3600000014 HC SURGERY LEVEL 4 ADDTL 15MIN: Performed by: ORTHOPAEDIC SURGERY

## 2021-11-16 PROCEDURE — 80048 BASIC METABOLIC PNL TOTAL CA: CPT

## 2021-11-16 PROCEDURE — 7100000001 HC PACU RECOVERY - ADDTL 15 MIN: Performed by: ORTHOPAEDIC SURGERY

## 2021-11-16 DEVICE — DUP USE 339820 IMPL KNEE PSN TIB STM 5 DEG SZ G L: Type: IMPLANTABLE DEVICE | Site: KNEE | Status: FUNCTIONAL

## 2021-11-16 DEVICE — COMPONENT PAT DIA35MM THK9MM STD KNEE VIVACIT-E CEM PERSONA: Type: IMPLANTABLE DEVICE | Site: KNEE | Status: FUNCTIONAL

## 2021-11-16 DEVICE — COMPONENT FEM SZ 11 STD L KNEE CO CHROM CEM POST STBL MID: Type: IMPLANTABLE DEVICE | Site: KNEE | Status: FUNCTIONAL

## 2021-11-16 DEVICE — IMPLANTABLE DEVICE: Type: IMPLANTABLE DEVICE | Site: KNEE | Status: FUNCTIONAL

## 2021-11-16 DEVICE — CEMENT BNE 40GM HI VISC RADPQ FOR REV SURG: Type: IMPLANTABLE DEVICE | Site: KNEE | Status: FUNCTIONAL

## 2021-11-16 DEVICE — UPCHARGE KNEE VITAMIN E PATELLA ZIMMER BIOMET: Type: IMPLANTABLE DEVICE | Status: FUNCTIONAL

## 2021-11-16 DEVICE — SCREW BNE L25MM DIA2.5MM KNEE FULL THRD HEX FEM PERSONA: Type: IMPLANTABLE DEVICE | Status: FUNCTIONAL

## 2021-11-16 DEVICE — UPCHARGE KNEE VITAMIN E LINER ZIMMER BIOMET: Type: IMPLANTABLE DEVICE | Status: FUNCTIONAL

## 2021-11-16 DEVICE — SCREW BNE HD 3.5X48 MM HEX PERSONA: Type: IMPLANTABLE DEVICE | Status: FUNCTIONAL

## 2021-11-16 RX ORDER — OXYCODONE HYDROCHLORIDE 5 MG/1
10 TABLET ORAL PRN
Status: COMPLETED | OUTPATIENT
Start: 2021-11-16 | End: 2021-11-16

## 2021-11-16 RX ORDER — PROPOFOL 10 MG/ML
INJECTION, EMULSION INTRAVENOUS PRN
Status: DISCONTINUED | OUTPATIENT
Start: 2021-11-16 | End: 2021-11-16 | Stop reason: SDUPTHER

## 2021-11-16 RX ORDER — TRANEXAMIC ACID 100 MG/ML
1000 INJECTION, SOLUTION INTRAVENOUS SEE ADMIN INSTRUCTIONS
Status: DISCONTINUED | OUTPATIENT
Start: 2021-11-16 | End: 2021-11-16 | Stop reason: HOSPADM

## 2021-11-16 RX ORDER — SODIUM CHLORIDE 9 MG/ML
INJECTION, SOLUTION INTRAVENOUS CONTINUOUS
Status: ACTIVE | OUTPATIENT
Start: 2021-11-16 | End: 2021-11-16

## 2021-11-16 RX ORDER — FENTANYL CITRATE 50 UG/ML
25 INJECTION, SOLUTION INTRAMUSCULAR; INTRAVENOUS EVERY 5 MIN PRN
Status: DISCONTINUED | OUTPATIENT
Start: 2021-11-16 | End: 2021-11-16 | Stop reason: HOSPADM

## 2021-11-16 RX ORDER — EPHEDRINE SULFATE 50 MG/ML
INJECTION, SOLUTION INTRAVENOUS PRN
Status: DISCONTINUED | OUTPATIENT
Start: 2021-11-16 | End: 2021-11-16 | Stop reason: SDUPTHER

## 2021-11-16 RX ORDER — MEPERIDINE HYDROCHLORIDE 50 MG/ML
12.5 INJECTION INTRAMUSCULAR; INTRAVENOUS; SUBCUTANEOUS EVERY 5 MIN PRN
Status: DISCONTINUED | OUTPATIENT
Start: 2021-11-16 | End: 2021-11-16 | Stop reason: HOSPADM

## 2021-11-16 RX ORDER — SODIUM CHLORIDE 0.9 % (FLUSH) 0.9 %
10 SYRINGE (ML) INJECTION PRN
Status: DISCONTINUED | OUTPATIENT
Start: 2021-11-16 | End: 2021-11-16 | Stop reason: HOSPADM

## 2021-11-16 RX ORDER — SODIUM CHLORIDE 9 MG/ML
25 INJECTION, SOLUTION INTRAVENOUS PRN
Status: DISCONTINUED | OUTPATIENT
Start: 2021-11-16 | End: 2021-11-16 | Stop reason: HOSPADM

## 2021-11-16 RX ORDER — MAGNESIUM HYDROXIDE 1200 MG/15ML
LIQUID ORAL CONTINUOUS PRN
Status: COMPLETED | OUTPATIENT
Start: 2021-11-16 | End: 2021-11-16

## 2021-11-16 RX ORDER — PROMETHAZINE HYDROCHLORIDE 25 MG/ML
6.25 INJECTION, SOLUTION INTRAMUSCULAR; INTRAVENOUS
Status: DISCONTINUED | OUTPATIENT
Start: 2021-11-16 | End: 2021-11-16 | Stop reason: HOSPADM

## 2021-11-16 RX ORDER — FENTANYL CITRATE 50 UG/ML
INJECTION, SOLUTION INTRAMUSCULAR; INTRAVENOUS PRN
Status: DISCONTINUED | OUTPATIENT
Start: 2021-11-16 | End: 2021-11-16 | Stop reason: SDUPTHER

## 2021-11-16 RX ORDER — HYDRALAZINE HYDROCHLORIDE 20 MG/ML
5 INJECTION INTRAMUSCULAR; INTRAVENOUS EVERY 10 MIN PRN
Status: DISCONTINUED | OUTPATIENT
Start: 2021-11-16 | End: 2021-11-16 | Stop reason: HOSPADM

## 2021-11-16 RX ORDER — GENTAMICIN SULFATE 40 MG/ML
80 INJECTION, SOLUTION INTRAMUSCULAR; INTRAVENOUS ONCE
Status: DISCONTINUED | OUTPATIENT
Start: 2021-11-16 | End: 2021-11-17 | Stop reason: HOSPADM

## 2021-11-16 RX ORDER — LABETALOL HYDROCHLORIDE 5 MG/ML
5 INJECTION, SOLUTION INTRAVENOUS EVERY 10 MIN PRN
Status: DISCONTINUED | OUTPATIENT
Start: 2021-11-16 | End: 2021-11-16 | Stop reason: HOSPADM

## 2021-11-16 RX ORDER — ONDANSETRON 4 MG/1
4 TABLET, ORALLY DISINTEGRATING ORAL EVERY 8 HOURS PRN
Status: DISCONTINUED | OUTPATIENT
Start: 2021-11-16 | End: 2021-11-17 | Stop reason: HOSPADM

## 2021-11-16 RX ORDER — ONDANSETRON 2 MG/ML
4 INJECTION INTRAMUSCULAR; INTRAVENOUS EVERY 6 HOURS PRN
Status: DISCONTINUED | OUTPATIENT
Start: 2021-11-16 | End: 2021-11-17 | Stop reason: HOSPADM

## 2021-11-16 RX ORDER — SENNA AND DOCUSATE SODIUM 50; 8.6 MG/1; MG/1
1 TABLET, FILM COATED ORAL 2 TIMES DAILY
Status: DISCONTINUED | OUTPATIENT
Start: 2021-11-16 | End: 2021-11-17 | Stop reason: HOSPADM

## 2021-11-16 RX ORDER — OXYCODONE HYDROCHLORIDE 5 MG/1
5 TABLET ORAL PRN
Status: COMPLETED | OUTPATIENT
Start: 2021-11-16 | End: 2021-11-16

## 2021-11-16 RX ORDER — OXYCODONE HYDROCHLORIDE 5 MG/1
10 TABLET ORAL EVERY 4 HOURS PRN
Status: DISCONTINUED | OUTPATIENT
Start: 2021-11-16 | End: 2021-11-16

## 2021-11-16 RX ORDER — TRAMADOL HYDROCHLORIDE 50 MG/1
50 TABLET ORAL EVERY 6 HOURS PRN
Status: DISCONTINUED | OUTPATIENT
Start: 2021-11-16 | End: 2021-11-16

## 2021-11-16 RX ORDER — FENTANYL CITRATE 50 UG/ML
50 INJECTION, SOLUTION INTRAMUSCULAR; INTRAVENOUS EVERY 5 MIN PRN
Status: DISCONTINUED | OUTPATIENT
Start: 2021-11-16 | End: 2021-11-16 | Stop reason: HOSPADM

## 2021-11-16 RX ORDER — SODIUM CHLORIDE, SODIUM LACTATE, POTASSIUM CHLORIDE, CALCIUM CHLORIDE 600; 310; 30; 20 MG/100ML; MG/100ML; MG/100ML; MG/100ML
INJECTION, SOLUTION INTRAVENOUS CONTINUOUS
Status: DISCONTINUED | OUTPATIENT
Start: 2021-11-16 | End: 2021-11-16

## 2021-11-16 RX ORDER — OXYCODONE HYDROCHLORIDE 5 MG/1
10 TABLET ORAL EVERY 6 HOURS PRN
Status: DISCONTINUED | OUTPATIENT
Start: 2021-11-16 | End: 2021-11-17 | Stop reason: HOSPADM

## 2021-11-16 RX ORDER — SODIUM CHLORIDE 0.9 % (FLUSH) 0.9 %
10 SYRINGE (ML) INJECTION EVERY 12 HOURS SCHEDULED
Status: DISCONTINUED | OUTPATIENT
Start: 2021-11-16 | End: 2021-11-16 | Stop reason: HOSPADM

## 2021-11-16 RX ORDER — PHENYLEPHRINE HCL IN 0.9% NACL 1 MG/10 ML
SYRINGE (ML) INTRAVENOUS PRN
Status: DISCONTINUED | OUTPATIENT
Start: 2021-11-16 | End: 2021-11-16 | Stop reason: SDUPTHER

## 2021-11-16 RX ORDER — GENTAMICIN 10 MG/ML
80 INJECTION, SOLUTION INTRAMUSCULAR; INTRAVENOUS ONCE
Status: DISCONTINUED | OUTPATIENT
Start: 2021-11-16 | End: 2021-11-16 | Stop reason: SDUPTHER

## 2021-11-16 RX ORDER — MIDAZOLAM HYDROCHLORIDE 1 MG/ML
INJECTION INTRAMUSCULAR; INTRAVENOUS PRN
Status: DISCONTINUED | OUTPATIENT
Start: 2021-11-16 | End: 2021-11-16 | Stop reason: SDUPTHER

## 2021-11-16 RX ORDER — ACETAMINOPHEN 325 MG/1
650 TABLET ORAL EVERY 6 HOURS
Status: DISCONTINUED | OUTPATIENT
Start: 2021-11-16 | End: 2021-11-17 | Stop reason: HOSPADM

## 2021-11-16 RX ORDER — SODIUM CHLORIDE 0.9 % (FLUSH) 0.9 %
5-40 SYRINGE (ML) INJECTION EVERY 12 HOURS SCHEDULED
Status: DISCONTINUED | OUTPATIENT
Start: 2021-11-16 | End: 2021-11-17 | Stop reason: HOSPADM

## 2021-11-16 RX ORDER — BUPIVACAINE HYDROCHLORIDE 7.5 MG/ML
INJECTION, SOLUTION INTRASPINAL PRN
Status: DISCONTINUED | OUTPATIENT
Start: 2021-11-16 | End: 2021-11-16 | Stop reason: SDUPTHER

## 2021-11-16 RX ORDER — ONDANSETRON 2 MG/ML
4 INJECTION INTRAMUSCULAR; INTRAVENOUS
Status: COMPLETED | OUTPATIENT
Start: 2021-11-16 | End: 2021-11-16

## 2021-11-16 RX ORDER — OXYCODONE HYDROCHLORIDE 5 MG/1
5 TABLET ORAL EVERY 6 HOURS PRN
Status: DISCONTINUED | OUTPATIENT
Start: 2021-11-16 | End: 2021-11-17 | Stop reason: HOSPADM

## 2021-11-16 RX ORDER — SODIUM CHLORIDE 0.9 % (FLUSH) 0.9 %
5-40 SYRINGE (ML) INJECTION PRN
Status: DISCONTINUED | OUTPATIENT
Start: 2021-11-16 | End: 2021-11-17 | Stop reason: HOSPADM

## 2021-11-16 RX ORDER — SODIUM CHLORIDE 9 MG/ML
25 INJECTION, SOLUTION INTRAVENOUS PRN
Status: DISCONTINUED | OUTPATIENT
Start: 2021-11-16 | End: 2021-11-17 | Stop reason: HOSPADM

## 2021-11-16 RX ADMIN — HYDROMORPHONE HYDROCHLORIDE 0.5 MG: 1 INJECTION, SOLUTION INTRAMUSCULAR; INTRAVENOUS; SUBCUTANEOUS at 11:54

## 2021-11-16 RX ADMIN — CEFAZOLIN SODIUM 2000 MG: 10 INJECTION, POWDER, FOR SOLUTION INTRAVENOUS at 07:42

## 2021-11-16 RX ADMIN — PROPOFOL 30 MG: 10 INJECTION, EMULSION INTRAVENOUS at 08:39

## 2021-11-16 RX ADMIN — TRANEXAMIC ACID 1000 MG: 100 INJECTION, SOLUTION INTRAVENOUS at 10:12

## 2021-11-16 RX ADMIN — FENTANYL CITRATE 25 MCG: 50 INJECTION INTRAMUSCULAR; INTRAVENOUS at 10:06

## 2021-11-16 RX ADMIN — FENTANYL CITRATE 50 MCG: 50 INJECTION, SOLUTION INTRAMUSCULAR; INTRAVENOUS at 15:05

## 2021-11-16 RX ADMIN — TRANEXAMIC ACID 1000 MG: 100 INJECTION, SOLUTION INTRAVENOUS at 07:56

## 2021-11-16 RX ADMIN — HYDROMORPHONE HYDROCHLORIDE 0.5 MG: 1 INJECTION, SOLUTION INTRAMUSCULAR; INTRAVENOUS; SUBCUTANEOUS at 14:12

## 2021-11-16 RX ADMIN — CEFAZOLIN 2000 MG: 10 INJECTION, POWDER, FOR SOLUTION INTRAVENOUS at 19:29

## 2021-11-16 RX ADMIN — FAMOTIDINE 20 MG: 10 INJECTION, SOLUTION INTRAVENOUS at 06:36

## 2021-11-16 RX ADMIN — FENTANYL CITRATE 25 MCG: 50 INJECTION INTRAMUSCULAR; INTRAVENOUS at 09:46

## 2021-11-16 RX ADMIN — SODIUM CHLORIDE: 9 INJECTION, SOLUTION INTRAVENOUS at 14:16

## 2021-11-16 RX ADMIN — EPHEDRINE SULFATE 10 MG: 50 INJECTION INTRAMUSCULAR; INTRAVENOUS; SUBCUTANEOUS at 08:20

## 2021-11-16 RX ADMIN — Medication 80 MCG: at 07:58

## 2021-11-16 RX ADMIN — SODIUM CHLORIDE, SODIUM LACTATE, POTASSIUM CHLORIDE, AND CALCIUM CHLORIDE: .6; .31; .03; .02 INJECTION, SOLUTION INTRAVENOUS at 07:27

## 2021-11-16 RX ADMIN — MIDAZOLAM HYDROCHLORIDE 1 MG: 2 INJECTION, SOLUTION INTRAMUSCULAR; INTRAVENOUS at 07:35

## 2021-11-16 RX ADMIN — ONDANSETRON 4 MG: 2 INJECTION INTRAMUSCULAR; INTRAVENOUS at 17:01

## 2021-11-16 RX ADMIN — OXYCODONE 10 MG: 5 TABLET ORAL at 20:07

## 2021-11-16 RX ADMIN — DOCUSATE SODIUM 50 MG AND SENNOSIDES 8.6 MG 1 TABLET: 8.6; 5 TABLET, FILM COATED ORAL at 19:26

## 2021-11-16 RX ADMIN — PROPOFOL 150 MCG/KG/MIN: 10 INJECTION, EMULSION INTRAVENOUS at 07:43

## 2021-11-16 RX ADMIN — HYDROMORPHONE HYDROCHLORIDE 0.5 MG: 1 INJECTION, SOLUTION INTRAMUSCULAR; INTRAVENOUS; SUBCUTANEOUS at 16:05

## 2021-11-16 RX ADMIN — ONDANSETRON 4 MG: 2 INJECTION INTRAMUSCULAR; INTRAVENOUS at 19:56

## 2021-11-16 RX ADMIN — EPHEDRINE SULFATE 10 MG: 50 INJECTION INTRAMUSCULAR; INTRAVENOUS; SUBCUTANEOUS at 08:58

## 2021-11-16 RX ADMIN — FENTANYL CITRATE 25 MCG: 50 INJECTION INTRAMUSCULAR; INTRAVENOUS at 07:37

## 2021-11-16 RX ADMIN — BUPIVACAINE HYDROCHLORIDE 2 MG: 7.5 INJECTION, SOLUTION SUBARACHNOID at 07:39

## 2021-11-16 RX ADMIN — HYDROMORPHONE HYDROCHLORIDE 0.5 MG: 1 INJECTION, SOLUTION INTRAMUSCULAR; INTRAVENOUS; SUBCUTANEOUS at 12:09

## 2021-11-16 RX ADMIN — FENTANYL CITRATE 25 MCG: 50 INJECTION INTRAMUSCULAR; INTRAVENOUS at 10:41

## 2021-11-16 RX ADMIN — PROPOFOL 40 MG: 10 INJECTION, EMULSION INTRAVENOUS at 07:43

## 2021-11-16 RX ADMIN — TRAMADOL HYDROCHLORIDE 50 MG: 50 TABLET ORAL at 16:12

## 2021-11-16 RX ADMIN — CEFAZOLIN SODIUM 2000 MG: 10 INJECTION, POWDER, FOR SOLUTION INTRAVENOUS at 10:55

## 2021-11-16 RX ADMIN — ACETAMINOPHEN 650 MG: 325 TABLET ORAL at 19:26

## 2021-11-16 RX ADMIN — OXYCODONE 5 MG: 5 TABLET ORAL at 12:22

## 2021-11-16 ASSESSMENT — PULMONARY FUNCTION TESTS
PIF_VALUE: 0
PIF_VALUE: 1
PIF_VALUE: 0

## 2021-11-16 ASSESSMENT — PAIN DESCRIPTION - ORIENTATION
ORIENTATION: LEFT

## 2021-11-16 ASSESSMENT — PAIN SCALES - GENERAL
PAINLEVEL_OUTOF10: 8
PAINLEVEL_OUTOF10: 8
PAINLEVEL_OUTOF10: 5
PAINLEVEL_OUTOF10: 8
PAINLEVEL_OUTOF10: 6
PAINLEVEL_OUTOF10: 5
PAINLEVEL_OUTOF10: 8
PAINLEVEL_OUTOF10: 7

## 2021-11-16 ASSESSMENT — PAIN DESCRIPTION - ONSET: ONSET: GRADUAL

## 2021-11-16 ASSESSMENT — PAIN DESCRIPTION - DESCRIPTORS: DESCRIPTORS: SORE;SHARP;DISCOMFORT

## 2021-11-16 ASSESSMENT — PAIN DESCRIPTION - PROGRESSION: CLINICAL_PROGRESSION: GRADUALLY WORSENING

## 2021-11-16 ASSESSMENT — PAIN DESCRIPTION - LOCATION
LOCATION: KNEE

## 2021-11-16 ASSESSMENT — PAIN DESCRIPTION - FREQUENCY: FREQUENCY: CONTINUOUS

## 2021-11-16 ASSESSMENT — PAIN DESCRIPTION - PAIN TYPE
TYPE: SURGICAL PAIN
TYPE: CHRONIC PAIN
TYPE: SURGICAL PAIN

## 2021-11-16 ASSESSMENT — PAIN SCALES - WONG BAKER: WONGBAKER_NUMERICALRESPONSE: 0

## 2021-11-16 ASSESSMENT — PAIN DESCRIPTION - DIRECTION: RADIATING_TOWARDS: NOT RADIATING

## 2021-11-16 ASSESSMENT — PAIN - FUNCTIONAL ASSESSMENT: PAIN_FUNCTIONAL_ASSESSMENT: PREVENTS OR INTERFERES WITH MANY ACTIVE NOT PASSIVE ACTIVITIES

## 2021-11-16 NOTE — PROGRESS NOTES
Patient to PACU from OR. Report received from THO Jack and Jac Hernandez RN and Car Arce RN. Patient with eyes closed, oral airway in place, alert to voice, pt removed oral airway prior to verbal report completed before staff. Patient stable on room air. Patient without s/s of pain when assessed. SCD's in place; ICE applied. VS obtained and filed. Will continue to monitor.

## 2021-11-16 NOTE — OP NOTE
Operative Note      Patient: Bobby Amaral  YOB: 1950  MRN: [id number]    Date of Procedure: 11/16/2021    Pre-Op Diagnosis: LEFT KNEE OSTEOARTHRITIS    Post-Op Diagnosis: Same       Procedure(s):  LEFT TOTAL KNEE ARTHROPLASTY    Surgeon(s):  Lala Pollard MD    Assistant:   Surgical Assistant: Iraida Vance    Anesthesia: Spinal    Estimated Blood Loss (mL): 83XL    Complications: None    Specimens:   * No specimens in log *    Implants:  Implant Name Type Inv. Item Serial No.  Lot No. LRB No. Used Action   SCREW BNE HD 3.5X48 MM HEX PERSONA  SCREW BNE HD 3.5X48 MM HEX PERSONA  [employer]- [id number] Left 1 Implanted   SCREW BNE L25MM DIA2. 5MM KNEE FULL THRD HEX FEM PERSONA  SCREW BNE L25MM DIA2. 5MM KNEE FULL THRD HEX FEM PERSONA  [employer]- [id number] Left 1 Implanted   CEMENT BNE 40GM HI VISC RADPQ FOR REV SURG  CEMENT BNE 40GM HI VISC RADPQ FOR REV SURG  [employer]- [id number] Left 1 Implanted   CEMENT BNE 40GM HI VISC RADPQ FOR REV SURG  CEMENT BNE 40GM HI VISC RADPQ FOR REV SURG  [employer]- [id number] Left 1 Implanted   COMPONENT FEM SZ 11 STD L KNEE CO CHROM WENDY POST STBL MID  COMPONENT FEM SZ 11 STD L KNEE CO CHROM WENDY POST STBL MID  [employer]WD [id number] Left 1 Implanted   DUP USE [id number] IMPL KNEE PSN TIB STM 5 DEG SZ G L Knee DUP USE [id number] IMPL KNEE PSN TIB STM 5 DEG SZ G L  [employer]-[facility] [id number] Left 1 Implanted   COMPONENT PAT [id number] THK9MM STD KNEE VIVACIT-E WENDY PERSONA  COMPONENT PAT [id number] THK9MM STD KNEE VIVACIT-E WENDY PERSONA  [employer] [id number] Left 1 Implanted   COMPONENT ARTC SURF PS 10-12 GH 11 MM LT TIB KNEE FIX BEAR  COMPONENT ARTC SURF PS 10-12 GH 11 MM LT TIB KNEE FIX BEAR  [employer] [id number] Left 1 Implanted         Drains: * No LDAs found *    Findings: Severe tricompartmental left knee OA    Detailed Description of Procedure:       OPERATIVE INDICATIONS: Patient is a 66 y.o. male with a history of left knee pain and osteoarthritis. Please see outpatient notes for details of clinical history prior clinical treatment courses rendered. After discussion with the patient regarding continued non operative versus operative measures including risks and benefits of each. Understanding these, the patient wished to proceed with surgery. These risks included but were not limited to: damage to nerves, arteries, tendons, veins, infection, bleeding, continued pain, continued disability, need for revision surgery, intraoperative fracture, postoperative fracture, component malalignment/malposition, painful scar, new onset pain, worsening pain, malunion, non union, delayed union, loss of knee range of motion, stiffness, damage to ligaments causing knee instability potentially requiring bracing , venothromboembolism, associated complications with anesthesia, and ultimately death. Understanding these, a signed and documented consent was placed in the patients chart. OPERATIVE PROCEDURE: The patient was correctly identified in the preoperative holding area. The left lower extremity was marked by the surgeon. All questions were answered. Ultimately the patient was transported back to the operating room placed supine on the OR table with all bony prominences well padded. See above EUA findings. The left lower extremity was prepped and draped in the standard sterile fashion after the patient received Ancef IV x1 within 60 minutes prior to surgery as well as 1 g IV of TXA at induction of anesthesia by anesthesia, a timeout was performed per protocol, and the patient had SCDs on the contralateral lower extremity for venothromboembolism prophylaxis. The limb was raised for exsanguination and tourniquet was raised to 250 mm of mercury. Standard vertical midline incision was made. Satisfactory soft tissue planes were developed. Medial parapatellar incision was then made to gain access to the joint.  Satisfactory cuff of VMO quadriceps tendon remained for repair of the arthrotomy at the end of the case. Patella was everted and osteophytes were resected. Satisfactory deep MCL capsular release was performed in standard fashion. Osteophytes were resected off the femur and tibia as needed for satisfactory visualization. Fat pad was resected.  starting hole was then made an intramedullary arabella was introduced into the femur. Distal femoral resection was carried out to 12 mm given flexion contracture as documented prior. Great care was taken throughout the case to protect the medial and lateral collateral ligaments and soft tissue with retractors/Homans. 4-in-1 cutting guide was then placed in standard fashion 3 degrees externally rotated to the epicondylar line. This was satisfactorily sized to an 11 implant limit/avoid notching. Anterior and posterior as well as chamfer cuts were also made in standard fashion with protecting medial lateral collateral ligaments. Attention then turned to the tibia resection. Extra medullary alignment guide was placed per routine fashion with 2mm to be resected off the low sidemedially. Flexion and extension blocks were then placed per routine and these were found to be tight in flexion and extension and so additional tibial cuts were made as needed. Posterior osteophytes off the medial and lateral condyles were then resected in standard fashion and the posterior capsule was infiltrated with Exparel 25cc total. Ultimately the tibia was sized to a size G. This was dialed into the medial third of the tibial tubercle and trial was placed. Previously sized femoral component was placed in standard fashion as well. 10mm polyliner was placed and the knee brought out to full extension and was found to be stable with varus/valgus stress at 0 and 30degrees. The knee was stable in full flexion and at 90 degrees with varus and valgus stress. The patella was everted and ultimately measured with a caliper. LLE, Elevation and bridging knee exercises, PT OT ordered and pt to receive postop abx per SCIP measure and ECASA 325mg POD 1 for DVT/VTE prophy    Postoperative plan: Patient will follow postop protocol per routine and start outpatient PT.       Electronically signed by Nell Echols MD on 11/16/21 at 11:43 AM EST      Electronically signed by Nell Echols MD on 11/16/2021 at 11:42 AM

## 2021-11-16 NOTE — PROGRESS NOTES
Arrived to [facility] consent verified, NPO since 2000, belongings on cart for admission (wedding ring given to wife).

## 2021-11-16 NOTE — ANESTHESIA PROCEDURE NOTES
Spinal Block    Patient location during procedure: OR  Start time: 11/16/2021 7:34 AM  End time: 11/16/2021 7:40 AM  Reason for block: primary anesthetic  Staffing  Performed: resident/CRNA   Anesthesiologist: Lashonda Tamayo MD  Resident/CRNA: FRANCISCO Ribeiro CRNA  Preanesthetic Checklist  Completed: patient identified, IV checked, site marked, risks and benefits discussed, surgical consent, monitors and equipment checked, pre-op evaluation, timeout performed, anesthesia consent given, oxygen available and patient being monitored  Spinal Block  Patient position: sitting  Prep: Betadine  Patient monitoring: cardiac monitor, continuous pulse ox, continuous capnometry and frequent blood pressure checks  Approach: midline  Location: L3/L4  Guidance: paresthesia technique  Provider prep: mask and sterile gloves  Local infiltration: lidocaine  Agent: bupivacaine  Dose: 2  Dose: 2  Needle  Needle type: Pencan   Needle gauge: 24 G  Needle length: 4 in  Assessment  Sensory level: T10  Events: cerebrospinal fluid  Swirl obtained: Yes  CSF: clear  Attempts: 1  Hemodynamics: stable

## 2021-11-16 NOTE — ANESTHESIA PRE PROCEDURE
Department of Anesthesiology  Preprocedure Note       Name:  Michelle Carson   Age:  66 y.o.  :  1950                                          MRN:  [id number]         Date:  2021      Surgeon: Ilya Barros):  Irma Knight MD    Procedure: Procedure(s):  LEFT TOTAL KNEE ARTHROPLASTY    Medications prior to admission:   Prior to Admission medications    Not on File       Current medications:    Current Facility-Administered Medications   Medication Dose Route Frequency Provider Last Rate Last Admin    lactated ringers infusion   IntraVENous Continuous Troy Lerma MD        sodium chloride flush 0.9 % injection 10 mL  10 mL IntraVENous 2 times per day Troy Lerma MD        sodium chloride flush 0.9 % injection 10 mL  10 mL IntraVENous PRN rToy Lerma MD        0.9 % sodium chloride infusion  25 mL IntraVENous PRN Troy Lerma MD        famotidine (PEPCID) injection 20 mg  20 mg IntraVENous Once Troy Lerma MD        ceFAZolin (ANCEF) 2000 mg in dextrose 5 % 100 mL IVPB  2,000 mg IntraVENous On Call to [address] [place] [address] MD        tranexamic acid (CYKLOKAPRON) injection 1,000 mg  1,000 mg IntraVENous See Admin Instructions Irma Knight MD         No current outpatient medications on file. Allergies:     Allergies   Allergen Reactions    Codeine      Unsure of rxn       Problem List:    Patient Active Problem List   Diagnosis Code    Primary osteoarthritis of left knee M17.12    Chronic pain of left knee M25.562, G89.29       Past Medical History:        Diagnosis Date    Arthritis      OSTEOARTHRITIS BILATERAL KNEES    Hypertension     pt denies        Past Surgical History:        Procedure Laterality Date    JOINT REPLACEMENT Right 3013    knee    KNEE SURGERY      OTHER SURGICAL HISTORY      WIRED JAW SHUT    PAIN MANAGEMENT PROCEDURE N/A 2020    LUMBAR FIVE SACRAL ONE EPIDURAL STEROID INJECTION SITE CONFIRMED BY FLUOROSCOPY performed by Addie Cushing, MD at [facility] VARICOCELECTOMY      VASECTOMY         Social History:    Social History     Tobacco Use    Smoking status: Never Smoker    Smokeless tobacco: Never Used   Substance Use Topics    Alcohol use: Yes     Comment: 10 per week                                Counseling given: Not Answered      Vital Signs (Current):   Vitals:    11/11/21 1412   Weight: 200 lb (90.7 kg)   Height: 6' (1.829 m)                                              BP Readings from Last 3 Encounters:   06/23/20 (!) 141/85   05/13/19 124/80   07/22/14 (!) 154/91       NPO Status:                                                                                 BMI:   Wt Readings from Last 3 Encounters:   07/22/21 220 lb (99.8 kg)   04/20/21 220 lb (99.8 kg)   06/12/20 220 lb (99.8 kg)     Body mass index is 27.12 kg/m². CBC: No results found for: WBC, RBC, HGB, HCT, MCV, RDW, PLT    CMP: No results found for: NA, K, CL, CO2, BUN, CREATININE, GFRAA, AGRATIO, LABGLOM, GLUCOSE, PROT, CALCIUM, BILITOT, ALKPHOS, AST, ALT    POC Tests: No results for input(s): POCGLU, POCNA, POCK, POCCL, POCBUN, POCHEMO, POCHCT in the last 72 hours.     Coags: No results found for: PROTIME, INR, APTT    HCG (If Applicable): No results found for: PREGTESTUR, PREGSERUM, HCG, HCGQUANT     ABGs: No results found for: PHART, PO2ART, GDB1HGA, MUW9IUF, BEART, U3AKZIXB     Type & Screen (If Applicable):  No results found for: LABABO, LABRH    Drug/Infectious Status (If Applicable):  No results found for: HIV, HEPCAB    COVID-19 Screening (If Applicable): No results found for: COVID19        Anesthesia Evaluation  Patient summary reviewed and Nursing notes reviewed no history of anesthetic complications:   Airway: Mallampati: II  TM distance: >3 FB   Neck ROM: full  Mouth opening: > = 3 FB Dental: normal exam         Pulmonary:Negative Pulmonary ROS Cardiovascular:    (+) hypertension:,                   Neuro/Psych:   Negative Neuro/Psych ROS              GI/Hepatic/Renal: Neg GI/Hepatic/Renal ROS       (-) GERD, liver disease and no renal disease       Endo/Other: Negative Endo/Other ROS       (-) diabetes mellitus               Abdominal:             Vascular: negative vascular ROS. Other Findings:           Anesthesia Plan      spinal and regional     ASA 2     (Adductor canal block for post op pain.)  Induction: intravenous. Anesthetic plan and risks discussed with patient and spouse. Plan discussed with CRNA. All questions answered and agrees with plan.         Naeem Omalley MD   11/16/2021

## 2021-11-16 NOTE — PROGRESS NOTES
Occupational Therapy    Order received, chart reviewed. Patient currently working with physical therapy and awaiting a room on C[facility]. Will follow up.   MIRIAN Zurita/DAIJA

## 2021-11-16 NOTE — PROGRESS NOTES
Physical Therapy    Facility/Department: [facility] OR  Initial Assessment    NAME: Sivan Johnson  : 1950  MRN: [id number]    Date of Service: 2021    Discharge Recommendations:  24 hour supervision or assist   PT Equipment Recommendations  Equipment Needed: Yes  Mobility Devices: Brianna Davidson: Rolling  If pt is unable to be seen after this session, please let this note serve as discharge summary. Please see case management note for discharge disposition. Thank you. Assessment   Body structures, Functions, Activity limitations: Decreased functional mobility ; Decreased ROM; Decreased strength; Decreased endurance; Decreased balance; Increased pain  Assessment: Pt functioning below baseline following L TKA. Pt indep PTA. Today, Pt completed supie L LE exercises. Pt was able to transfer to sit EOB with Min A. Able to stand but due to block, was incontinent of urine. Pt assisted back to bed to get cleaned up. Recommend home upon DC with 24 hr A. Pt will need RW for home. Treatment Diagnosis: decreased mobility  Prognosis: Good  Decision Making: Low Complexity  PT Education: Goals; General Safety; Gait Training; Disease Specific Education; PT Role; Precautions; Weight-bearing Education; Injury Prevention; Functional Mobility Training; Equipment  Patient Education: Pt expressed understanding on use of AD, HEP and precautions  Barriers to Learning: none  REQUIRES PT FOLLOW UP: Yes  Activity Tolerance  Activity Tolerance: Patient Tolerated treatment well; Patient limited by pain; Patient limited by endurance       Patient Diagnosis(es): There were no encounter diagnoses. has a past medical history of Arthritis and Hypertension. has a past surgical history that includes Tonsillectomy; Varicocelectomy; Vasectomy; other surgical history; knee surgery; Pain management procedure (N/A, 2020); and joint replacement (Right, 3013).     Restrictions  Restrictions/Precautions  Restrictions/Precautions: Weight Bearing  Required Braces or Orthoses?: Yes  Lower Extremity Weight Bearing Restrictions  Left Lower Extremity Weight Bearing: Weight Bearing As Tolerated  Required Braces or Orthoses  Right Lower Extremity Brace: Knee Immobilizer (until knee block wears off)  Position Activity Restriction  Other position/activity restrictions: nothing under the knee, pillow ok for under heel or calf for knee extension  Vision/Hearing  Vision: Impaired  Vision Exceptions: Wears glasses for reading  Hearing: Within functional limits     Subjective  General  Chart Reviewed: Yes  Patient assessed for rehabilitation services?: Yes  Response To Previous Treatment: Not applicable  Family / Caregiver Present: No  Referring Practitioner: Giana Ellsworth MD  Referral Date : 11/16/21  Diagnosis: L TKA  Follows Commands: Within Functional Limits  General Comment  Comments: cleared by nursing  Subjective  Subjective: pt resting in bed. 5/10 L knee pain  Pain Screening  Patient Currently in Pain: Yes          Orientation  Orientation  Overall Orientation Status: Within Normal Limits  Social/Functional History  Social/Functional History  Lives With: Spouse (grandson 19yo)  Type of Home: House  Home Layout: One level  Home Access: Stairs to enter without rails  Entrance Stairs - Number of Steps: 2  Bathroom Shower/Tub: Walk-in shower  Bathroom Toilet: Standard  Bathroom Equipment: Built-in shower seat  Home Equipment: Crutches  ADL Assistance: Independent  Homemaking Assistance: Independent  Homemaking Responsibilities: Yes  Ambulation Assistance: Independent (without AD)  Transfer Assistance: Independent  Active : Yes  Occupation: Retired  Cognition        Objective          PROM RLE (degrees)  RLE PROM: WFL  AROM RLE (degrees)  RLE AROM: WFL  Strength RLE  Strength RLE: WFL  Strength LLE  Comment: able to demo SLR x 10 reps  Tone RLE  RLE Tone: Normotonic  Tone LLE  LLE Tone: Normotonic  Motor Control  Gross Motor?: WFL  Sensation  Overall Sensation Status: WFL  Bed mobility  Supine to Sit: Minimal assistance  Sit to Supine: Minimal assistance  Transfers  Sit to Stand: Minimal Assistance  Stand to sit: Minimal Assistance  Ambulation  Ambulation?: No (1 lateral step to [facility])     Balance  Posture: Good  Sitting - Static: Good  Sitting - Dynamic: Good  Standing - Static: Good; -  Standing - Dynamic: Fair  Exercises  Straight Leg Raise: x10 L  Quad Sets: x10 L  Heelslides: x10 L  Gluteal Sets: x10  Knee Short Arc Quad: x10 L  Ankle Pumps: x10 L     Plan   Plan  Times per week: BID  Times per day: Twice a day  Current Treatment Recommendations: Strengthening, ROM, Balance Training, Functional Mobility Training, Transfer Training, Gait Training, Stair training, Pain Management, Positioning, Modalities, Equipment Evaluation, Education, & procurement, Patient/Caregiver Education & Training, Safety Education & Training, Home Exercise Program  Safety Devices  Type of devices:  All fall risk precautions in place, Call light within reach, Gait belt, Patient at risk for falls, Left in bed, Nurse notified  Restraints  Initially in place: No      Goals  Short term goals  Time Frame for Short term goals: 11/18  Short term goal 1: Pt will complete L LE exericses per protocol - MET, on going  Short term goal 2: Pt will complete transfers with SBA  Short term goal 3: Pt will ambulate x 48' with RW with SBA  Short term goal 4: Pt will complete 2 steps with walker with CGA  Patient Goals   Patient goals : to walk 27' with LRAD wtih min A by 11/17       Therapy Time   Individual Concurrent Group Co-treatment   Time In 1340         Time Out 1415         Minutes 35         Timed Code Treatment Minutes: 24 [facility], PT

## 2021-11-16 NOTE — H&P
ORTHOPAEDIC SURGERY INTERVAL H&P    Gerald Montes was seen in the preoperative area, where a history and physical examination was reviewed and the patient was examined by me today. There have been no significant clinical changes since the completion of the previous recorded history and physical dated 7/22/21. The surgical site was confirmed by the patient and me and the surgical site was marked. The risks, benefits, and alternatives of the proposed procedure(s) have been explained to the patient (or appropriately confirmed guardian) and understanding was verbalized. Please see outpatient notes for details. Currently the patient wishes to proceed with surgery given left knee symptoms from left knee osteoarthritis as assessed by clinical exam and diagnostic imaging, which correlates to subjective symptoms. Understanding the risks and benefits of nonoperative versus operative treatments, he wishes to pursue surgery. Benefits of decreased pain and improved function were discussed with the patient as well as potential risks which included but were not limited to damage to nerves arteries tendons veins infection bleeding continued pain, new onset pain, ligament instability, instability, component malposition, intraoperative fracture, need for revision surgery, need for additional surgery, venous thromboembolism, and ultimately death. Understanding this, a signed document consent was placed in the patient's chart for left total knee arthroplasty. All questions were answered and a signed documented consent has been placed in the patient's chart. The patient wishes to proceed. On call to the OR. Electronically signed by: Rajwinder Cross MD,11/16/2021,7:26 AM         Rajwinder Cross MD       Orthopaedic Surgery-Sports Medicine      Disclaimer: This note was dictated with voice recognition software.   Though review and correction are routinely performed, please contact the office/medical records for any errors requiring correction.

## 2021-11-16 NOTE — ANESTHESIA PROCEDURE NOTES
Peripheral Block    Patient location during procedure: pre-op  End time: 11/16/2021 6:44 AM  Staffing  Performed: anesthesiologist   Anesthesiologist: Romana Warner MD  Preanesthetic Checklist  Completed: patient identified, IV checked, site marked, risks and benefits discussed, surgical consent, monitors and equipment checked, pre-op evaluation, timeout performed, anesthesia consent given, oxygen available and patient being monitored  Peripheral Block  Patient position: supine  Prep: ChloraPrep  Patient monitoring: cardiac monitor, continuous pulse ox, frequent blood pressure checks and IV access  Block type: Femoral  Laterality: left  Injection technique: single-shot  Guidance: ultrasound guided  Local infiltration: lidocaine  Infiltration strength: 1 %  Dose: 3 mL  Adductor canal (Low Femoral)  Provider prep: mask and sterile gloves  Local infiltration: lidocaine  Needle  Needle gauge: 21 G  Needle length: 10 cm  Needle localization: ultrasound guidance  Assessment  Injection assessment: negative aspiration for heme, no paresthesia on injection and local visualized surrounding nerve on ultrasound  Paresthesia pain: none  Slow fractionated injection: yes  Hemodynamics: stable  Additional Notes  Immediately prior to procedure a \"time out\" was called to verify the correct patient, allergies, laterality, procedure and equipment. Time out performed with RN    Local Anesthetic: 0.25 %  Bupivacaine   Amount: 30 ml  in 5 ml increments after negative aspiration each time. Versed 2 mg  IV    Iliopsoas Muscle and Fascia Iliaca, Femoral artery (Deep artery to the thigh take off), Femoral Vein and Femoral Nerve are identified; the tip of the need and the spread of the local anesthetic around the Femoral nerve are visualized. The Femoral nerve appeared to be anatomically normal and there were no abnormal pathologically findings seen.          Reason for block: post-op pain management and at surgeon's request

## 2021-11-17 VITALS
DIASTOLIC BLOOD PRESSURE: 87 MMHG | OXYGEN SATURATION: 92 % | SYSTOLIC BLOOD PRESSURE: 154 MMHG | BODY MASS INDEX: 29.87 KG/M2 | HEIGHT: 72 IN | WEIGHT: 220.5 LBS | HEART RATE: 78 BPM | TEMPERATURE: 98.4 F | RESPIRATION RATE: 16 BRPM

## 2021-11-17 LAB
ANION GAP SERPL CALCULATED.3IONS-SCNC: 11 MMOL/L (ref 3–16)
BASOPHILS ABSOLUTE: 0 K/UL (ref 0–0.2)
BASOPHILS RELATIVE PERCENT: 0.1 %
BUN BLDV-MCNC: 13 MG/DL (ref 7–20)
CALCIUM SERPL-MCNC: 8.4 MG/DL (ref 8.3–10.6)
CHLORIDE BLD-SCNC: 98 MMOL/L (ref 99–110)
CO2: 27 MMOL/L (ref 21–32)
CREAT SERPL-MCNC: 0.7 MG/DL (ref 0.8–1.3)
EOSINOPHILS ABSOLUTE: 0 K/UL (ref 0–0.6)
EOSINOPHILS RELATIVE PERCENT: 0 %
GFR AFRICAN AMERICAN: >60
GFR NON-AFRICAN AMERICAN: >60
GLUCOSE BLD-MCNC: 123 MG/DL (ref 70–99)
HCT VFR BLD CALC: 40.3 % (ref 40.5–52.5)
HEMOGLOBIN: 13.8 G/DL (ref 13.5–17.5)
LYMPHOCYTES ABSOLUTE: 1 K/UL (ref 1–5.1)
LYMPHOCYTES RELATIVE PERCENT: 9.8 %
MCH RBC QN AUTO: 32.7 PG (ref 26–34)
MCHC RBC AUTO-ENTMCNC: 34.4 G/DL (ref 31–36)
MCV RBC AUTO: 95 FL (ref 80–100)
MONOCYTES ABSOLUTE: 1 K/UL (ref 0–1.3)
MONOCYTES RELATIVE PERCENT: 9.8 %
NEUTROPHILS ABSOLUTE: 8 K/UL (ref 1.7–7.7)
NEUTROPHILS RELATIVE PERCENT: 80.3 %
PDW BLD-RTO: 12.5 % (ref 12.4–15.4)
PLATELET # BLD: 234 K/UL (ref 135–450)
PMV BLD AUTO: 7.3 FL (ref 5–10.5)
POTASSIUM SERPL-SCNC: 4 MMOL/L (ref 3.5–5.1)
RBC # BLD: 4.24 M/UL (ref 4.2–5.9)
SODIUM BLD-SCNC: 136 MMOL/L (ref 136–145)
WBC # BLD: 9.9 K/UL (ref 4–11)

## 2021-11-17 PROCEDURE — 6360000002 HC RX W HCPCS: Performed by: ORTHOPAEDIC SURGERY

## 2021-11-17 PROCEDURE — 97116 GAIT TRAINING THERAPY: CPT

## 2021-11-17 PROCEDURE — 36415 COLL VENOUS BLD VENIPUNCTURE: CPT

## 2021-11-17 PROCEDURE — 97166 OT EVAL MOD COMPLEX 45 MIN: CPT

## 2021-11-17 PROCEDURE — 97110 THERAPEUTIC EXERCISES: CPT

## 2021-11-17 PROCEDURE — G0378 HOSPITAL OBSERVATION PER HR: HCPCS

## 2021-11-17 PROCEDURE — 80048 BASIC METABOLIC PNL TOTAL CA: CPT

## 2021-11-17 PROCEDURE — 97535 SELF CARE MNGMENT TRAINING: CPT

## 2021-11-17 PROCEDURE — 2580000003 HC RX 258: Performed by: ORTHOPAEDIC SURGERY

## 2021-11-17 PROCEDURE — 85025 COMPLETE CBC W/AUTO DIFF WBC: CPT

## 2021-11-17 PROCEDURE — 6370000000 HC RX 637 (ALT 250 FOR IP): Performed by: ORTHOPAEDIC SURGERY

## 2021-11-17 PROCEDURE — 97530 THERAPEUTIC ACTIVITIES: CPT

## 2021-11-17 RX ORDER — SENNA AND DOCUSATE SODIUM 50; 8.6 MG/1; MG/1
1 TABLET, FILM COATED ORAL 2 TIMES DAILY
Qty: 20 TABLET | Refills: 0 | Status: SHIPPED | OUTPATIENT
Start: 2021-11-17 | End: 2021-11-27

## 2021-11-17 RX ORDER — OXYCODONE HYDROCHLORIDE 5 MG/1
5 TABLET ORAL EVERY 4 HOURS PRN
Qty: 42 TABLET | Refills: 0 | Status: SHIPPED | OUTPATIENT
Start: 2021-11-17 | End: 2021-11-24

## 2021-11-17 RX ADMIN — ASPIRIN 325 MG: 325 TABLET, COATED ORAL at 08:56

## 2021-11-17 RX ADMIN — CEFAZOLIN 2000 MG: 10 INJECTION, POWDER, FOR SOLUTION INTRAVENOUS at 02:39

## 2021-11-17 RX ADMIN — OXYCODONE 10 MG: 5 TABLET ORAL at 02:36

## 2021-11-17 RX ADMIN — OXYCODONE 10 MG: 5 TABLET ORAL at 08:55

## 2021-11-17 RX ADMIN — OXYCODONE 10 MG: 5 TABLET ORAL at 13:41

## 2021-11-17 RX ADMIN — ACETAMINOPHEN 650 MG: 325 TABLET ORAL at 13:13

## 2021-11-17 RX ADMIN — Medication 10 ML: at 08:56

## 2021-11-17 ASSESSMENT — PAIN SCALES - WONG BAKER
WONGBAKER_NUMERICALRESPONSE: 0

## 2021-11-17 ASSESSMENT — PAIN - FUNCTIONAL ASSESSMENT
PAIN_FUNCTIONAL_ASSESSMENT: PREVENTS OR INTERFERES SOME ACTIVE ACTIVITIES AND ADLS

## 2021-11-17 ASSESSMENT — PAIN SCALES - GENERAL
PAINLEVEL_OUTOF10: 5
PAINLEVEL_OUTOF10: 6
PAINLEVEL_OUTOF10: 4
PAINLEVEL_OUTOF10: 5
PAINLEVEL_OUTOF10: 5
PAINLEVEL_OUTOF10: 7
PAINLEVEL_OUTOF10: 6
PAINLEVEL_OUTOF10: 6

## 2021-11-17 ASSESSMENT — PAIN DESCRIPTION - PROGRESSION
CLINICAL_PROGRESSION: NOT CHANGED

## 2021-11-17 ASSESSMENT — PAIN DESCRIPTION - PAIN TYPE
TYPE: SURGICAL PAIN
TYPE: SURGICAL PAIN

## 2021-11-17 ASSESSMENT — PAIN DESCRIPTION - LOCATION
LOCATION: KNEE
LOCATION: KNEE

## 2021-11-17 ASSESSMENT — PAIN DESCRIPTION - ORIENTATION
ORIENTATION: LEFT
ORIENTATION: LEFT

## 2021-11-17 NOTE — PLAN OF CARE
Problem: Pain:  Goal: Pain level will decrease  Description: Pain level will decrease  11/17/2021 1254 by Asya Ravi RN  Outcome: Completed  11/17/2021 1118 by Asya Ravi RN  Outcome: Ongoing     Problem: Pain:  Goal: Control of acute pain  Description: Control of acute pain  11/17/2021 1254 by Asya Ravi RN  Outcome: Completed  11/17/2021 1118 by Asya Ravi RN  Outcome: Ongoing

## 2021-11-17 NOTE — PROGRESS NOTES
POST OPERATIVE ORTHOPAEDIC NOTE    DOS: 16 Nov 21  PROCEDURES: L TKA    The patient has been recovering. He has transitioned to po pain meds and was OOB last PM.  He has been working on full knee extension. He has been working with PT    Focused pertinent physical examination of the operative extremity:  Dressing C/D/I, no erythema or gross drainage  Slight swelling knee and without edema calf or ankle  NTTP calf  5/5 TA G EHL  SILT DP SP LP MP  CRT<2sec    AML:  WBC 9.9  HCT 43  Cr 0.7    Radiographs: 11/16/21 2V L knee: Neg fracture, satisfactory implant alignment    1. S/P total knee arthroplasty, left      Assessment and plan: The patient is now 1d status post the above listed procedure and is recovering.    -Patient finished postoperative IV antibiotics per SCIP measures x 2 doses postop  -Teds/SCDs for DVT/VTE prophylaxis and patient is to be started aspirin this morning 325 mg p.o. daily x6 weeks postoperatively  -Weightbearing as tolerated. Out of bed mobilization with formal physical therapy  -Ice elevation and work on bridging knee exercises to include full knee extension which I instructed him on again this morning as well as full knee flexion  -Oxycodone prn pain control and OTC Tylenol per bottle parent discomfort upon discharge as he has been on Tylenol around-the-clock  -All questions answered to the patient's satisfaction and the patient expressed understanding and agreement with the above listed treatment plan  -Follow up in 10 to 12 days postop for routine follow-up check with two-view nonweightbearing left knee  -Thank you for the clinical consultation and allowing me to participate in the patient's care. Electronically signed by Kelle Tinoco MD on 11/17/21 at 12:29 PM ROCIO Tinoco MD       Orthopaedic Surgery-Sports Medicine      Disclaimer: This note was dictated with voice recognition software.   Though review and correction are routinely performed, please contact the office/medical records for any errors requiring correction.

## 2021-11-17 NOTE — DISCHARGE SUMMARY
Department of Orthopedic Surgery  Physician Assistant   Discharge Summary    The Nick Verduzco is a 66 y.o. male underwent total knee replacement procedure without complication. Nick Verduzco was admitted to the floor following His recovery in the PACU. Discharge Diagnosis  left Knee Replacement    Current Inpatient Medications    Current Facility-Administered Medications: gentamicin (GARAMYCIN) injection 80 mg, 80 mg, Peritoneal catheter, Once  sodium chloride flush 0.9 % injection 5-40 mL, 5-40 mL, IntraVENous, 2 times per day  sodium chloride flush 0.9 % injection 5-40 mL, 5-40 mL, IntraVENous, PRN  0.9 % sodium chloride infusion, 25 mL, IntraVENous, PRN  acetaminophen (TYLENOL) tablet 650 mg, 650 mg, Oral, Q6H  ondansetron (ZOFRAN-ODT) disintegrating tablet 4 mg, 4 mg, Oral, Q8H PRN **OR** ondansetron (ZOFRAN) injection 4 mg, 4 mg, IntraVENous, Q6H PRN  sennosides-docusate sodium (SENOKOT-S) 8.6-50 MG tablet 1 tablet, 1 tablet, Oral, BID  aspirin EC tablet 325 mg, 325 mg, Oral, BID  oxyCODONE (ROXICODONE) immediate release tablet 5 mg, 5 mg, Oral, Q6H PRN  oxyCODONE (ROXICODONE) immediate release tablet 10 mg, 10 mg, Oral, Q6H PRN    Post-operatively the patients diet was advanced as tolerated and their incision was checked on POD #1. The incision is dressing in place, clean, dry and intact with no signs of infection. The patient remained neurovascularly intact in the lower extremity and had intact pulses distally. Patients calf remained soft and showed no evidence of DVT. The patient was able to move their leg and ankle/foot without any problems post-operatively. Physical therapy and occupational therapy were consulted and began working with the patient post-operatively. The patient progressed with PT/OT as would be expected and continued to improve through their stay.   The patients pain was initially controlled with IV medications but we were able to transition to oral pain medications soon after arrival to the floor and their pain remained under good control through their hospital stay. From a medical standpoint the patient remained stable and continued to have the medicine team follow throughout their stay. The patients dressing was changed/incison was checked on day of d/c. The patient will be discharged at this time to Home  with their current diet restrictions and will continue to follow the total knee precautions outlined to them by us and PT/OT. Condition on Discharge: Stable    Plan  Return visit in 1 week. .  Patient was instructed on the use of pain medications, the signs and symptoms of infection, and was given our number to call should they have any questions or concerns following discharge. For opioid prescriptions given at discharge the following statement is provided for compliance with [employer] rules. Patient being given increased dosage/quantity of opoid pain medication in excess of [employer] guidelines which noted a 30 MED daily of opioids due to the fact that he/she has undergone major orthopaedic surgery as outlined in rule 4731-11-13. Dosages and further duration of the pain medication will be re-evaluated at her post op visit in 2 weeks. Patient was educated on dosing expectations and limits of prescribing as a result of the new [facility] Board rules enacted August 31, 2017. Please also note that this is not the initial opoid prescription issued to this patient but a continuation of medication utilized during the hospital admission as noted in the medical record. [place]ARRS report has also been utilized to screen for any abuse history or suspicious activity as outlined in [place]. All efforts have been taken to prevent abuse potential and misuse of opioid medications including education, screening, and close clinical follow up.

## 2021-11-17 NOTE — PROGRESS NOTES
Delivered walker to [facility] room.   Thanks for the referral.  Triny Shea  11/17/2021

## 2021-11-17 NOTE — PROGRESS NOTES
Physical Therapy  Facility/Department: [place] [id number] - MED SURG/ORTHO  Daily Treatment Note/DC summary   NAME: Prashant Almazan  : 1950  MRN: [id number]    Date of Service: 2021    Discharge Recommendations:  24 hour supervision or assist   PT Equipment Recommendations  Equipment Needed: Yes  Walker: Rolling    Assessment   Body structures, Functions, Activity limitations: Decreased functional mobility ; Decreased ROM; Decreased strength; Decreased endurance; Decreased balance; Increased pain  Assessment: Pt progressing with mobility using a walker, able to ambulate 50' with SBA. Pt was also able to complete 2 steps with CGA and use of walker. Pt complete supine L LE exercises and given handout. Safe for DC home with 24 hr A. Will need RW for home. Treatment Diagnosis: decreased mobility  Prognosis: Good  Decision Making: Low Complexity  PT Education: Goals; General Safety; Gait Training; Disease Specific Education; PT Role; Precautions; Weight-bearing Education; Injury Prevention; Functional Mobility Training; Equipment  Patient Education: Pt expressed understanding on use of AD, HEP and precautions  Barriers to Learning: none  REQUIRES PT FOLLOW UP: No  Activity Tolerance  Activity Tolerance: Patient Tolerated treatment well; Patient limited by pain; Patient limited by endurance  Activity Tolerance: 178/95, 87 HR, 96% SpO2     Patient Diagnosis(es): The encounter diagnosis was S/P total knee arthroplasty, left.     has a past medical history of Arthritis and Hypertension. has a past surgical history that includes Tonsillectomy; Varicocelectomy; Vasectomy; other surgical history; knee surgery; Pain management procedure (N/A, 2020); joint replacement (Right, 3013); and Total knee arthroplasty (Left, 2021).     Restrictions  Restrictions/Precautions  Restrictions/Precautions: Weight Bearing  Required Braces or Orthoses?: Yes  Lower Extremity Weight Bearing Restrictions  Left Lower Extremity Weight Bearing: Weight Bearing As Tolerated  Required Braces or Orthoses  Right Lower Extremity Brace: Knee Immobilizer  Position Activity Restriction  Other position/activity restrictions: nothing under the knee, pillow ok for under heel or calf for knee extension  Subjective   General  Chart Reviewed: Yes  Response To Previous Treatment: Patient with no complaints from previous session. Family / Caregiver Present: No  Referring Practitioner: Rajwinder Cross MD  Subjective  Subjective: pt resting in bed. 5/10 L knee pain  General Comment  Comments: cleared by nursing          Orientation  Orientation  Overall Orientation Status: Within Normal Limits  Cognition      Objective   Bed mobility  Supine to Sit: Minimal assistance  Sit to Supine: Stand by assistance  Transfers  Sit to Stand: Stand by assistance  Stand to sit: Stand by assistance  Ambulation  Ambulation?: Yes  More Ambulation?: No  Ambulation 1  Surface: level tile  Device: Standard Walker  Assistance: Contact guard assistance; Stand by assistance  Quality of Gait: step to pattern leading with the L  Distance: 50'  Stairs/Curb  Stairs?: Yes  Stairs  # Steps : 2  Curbs: 6\"  Device: Standard walker  Assistance: Contact guard assistance     Balance  Posture: Good  Sitting - Static: Good  Sitting - Dynamic: Good  Standing - Static: Good; -  Standing - Dynamic: Fair; + (with walker)  Exercises  Straight Leg Raise: x10 L  Quad Sets: x10 L  Heelslides: x10 L  Gluteal Sets: x10  Knee Short Arc Quad: x10 L  Ankle Pumps: x10 L                  Goals  Short term goals  Time Frame for Short term goals: 11/18  Short term goal 1: Pt will complete L LE exericses per protocol - MET, on going  Short term goal 2: Pt will complete transfers with SBA. 11/17: min A for supine to sit due to pain  Short term goal 3: Pt will ambulate x 50' with RW with SBA. 11/17: MET with SW  Short term goal 4: Pt will complete 2 steps with walker with CGA.  11/17: MET  Patient Goals Patient goals : to walk 27' with LRAD wtih min A by 11/17 - MET    Plan    Plan  Times per week: DC  Times per day: Twice a day  Current Treatment Recommendations: Strengthening, ROM, Balance Training, Functional Mobility Training, Transfer Training, Gait Training, Stair training, Pain Management, Positioning, Modalities, Equipment Evaluation, Education, & procurement, Patient/Caregiver Education & Training, Safety Education & Training, Home Exercise Program  Safety Devices  Type of devices:  All fall risk precautions in place, Call light within reach, Gait belt, Patient at risk for falls, Left in bed, Nurse notified, Bed alarm in place  Restraints  Initially in place: No     Therapy Time   Individual Concurrent Group Co-treatment   Time In [facility]         Time Out 1005         Minutes [address]

## 2021-11-17 NOTE — PROGRESS NOTES
Occupational Therapy   Occupational Therapy Initial Assessment/ Treatment  Date: 2021   Patient Name: Georgie Bowens  MRN: [id number]     : 1950    Date of Service: 2021    Discharge Recommendations:  Georgie Bowens scored a 20/24 on the  [address] form. Current research shows that an AM-PAC score of 18 or greater is typically associated with a discharge to the patient's home setting. Based on the patient's AM-PAC score, and their current ADL deficits, it is recommended that the patient have 2-3 sessions per week of Occupational Therapy at d/c to increase the patient's independence. At this time, this patient demonstrates the endurance and safety to discharge home with home vs OP services and a follow up treatment frequency of 2-3x/wk. Please see assessment section for further patient specific details. If patient discharges prior to next session this note will serve as a discharge summary. Please see below for the latest assessment towards goals. OT Equipment Recommendations  Equipment Needed: No    Assessment   Performance deficits / Impairments: Decreased functional mobility ; Decreased endurance; Decreased coordination; Decreased ADL status; Decreased balance; Decreased high-level IADLs  Assessment: Prior to admission pt was living at home with his wife, was independent with ADLs and IADLs. Pt now presents below his baseline, s/p LTKA. Pt demonstrating CGA for mobiltiy and transfers, min A for LB dressing. Pt required min cueing to initiate tasks, however could be pts baseline. Pt plans to dc home with his wife to provide 24 hr assistance. Continue OT POC while pt remains in facility.   Treatment Diagnosis: decreased ADLs and transfers secondary to TKA  Prognosis: Good  Decision Making: Medium Complexity  OT Education: OT Role; Plan of Care  Patient Education: verb understanding  REQUIRES OT FOLLOW UP: Yes  Activity Tolerance  Activity Tolerance: Patient Tolerated treatment well; Patient limited by fatigue  Activity Tolerance: pt reported min fatigue after mobility in room  Safety Devices  Safety Devices in place: Yes  Type of devices: Left in chair; Nurse notified; Call light within reach; Chair alarm in place           Patient Diagnosis(es): The encounter diagnosis was S/P total knee arthroplasty, left.     has a past medical history of Arthritis and Hypertension. has a past surgical history that includes Tonsillectomy; Varicocelectomy; Vasectomy; other surgical history; knee surgery; Pain management procedure (N/A, 6/23/2020); joint replacement (Right, 11/3013); and Total knee arthroplasty (Left, 11/16/2021). Treatment Diagnosis: decreased ADLs and transfers secondary to TKA      Restrictions  Restrictions/Precautions  Restrictions/Precautions: Weight Bearing  Required Braces or Orthoses?: Yes  Lower Extremity Weight Bearing Restrictions  Left Lower Extremity Weight Bearing: Weight Bearing As Tolerated  Required Braces or Orthoses  Right Lower Extremity Brace: Knee Immobilizer  Position Activity Restriction  Other position/activity restrictions: nothing under the knee, pillow ok for under heel or calf for knee extension    Subjective   General  Chart Reviewed: Yes  Patient assessed for rehabilitation services?: Yes  Additional Pertinent Hx: Pt admitted for LEFT TOTAL KNEE ARTHROPLASTY on 11/16. PMHx includes: HTN, arthritis  Family / Caregiver Present: No  Referring Practitioner: Willie Vega MD  Diagnosis: L knee OA  Subjective  Subjective: Pt semi supine in bed upon arrival, agreeable to OT eval and treat with encouragement.  \"I can do all that when my wife comes\"  Pain Assessment  Pain Assessment: 0-10  Pain Level: 5  Campos-Baker Pain Rating: No hurt  Pain Type: Surgical pain  Pain Location: Knee  Pain Orientation: Left  Clinical Progression: Not changed  Functional Pain Assessment: Prevents or interferes some active activities and ADLs  Non-Pharmaceutical Pain Intervention(s): Rest; Repositioned; Cold applied; Ambulation/Increased Activity  Response to Pain Intervention: Asleep with RR greater than 10  Social/Functional History  Social/Functional History  Lives With: Spouse (grandson 20yo)  Type of Home: House  Home Layout: One level  Home Access: Stairs to enter without rails  Entrance Stairs - Number of Steps: 2  Bathroom Shower/Tub: Walk-in shower  Bathroom Toilet: Standard  Bathroom Equipment: Built-in shower seat  Home Equipment: Crutches  ADL Assistance: Independent  Homemaking Assistance: Independent  Homemaking Responsibilities: Yes  Ambulation Assistance: Independent (without AD)  Transfer Assistance: Independent  Active : Yes  Occupation: Retired       Objective   Vision: Impaired  Vision Exceptions: Wears glasses for reading  Hearing: Within functional limits    Orientation  Overall Orientation Status: Within Functional Limits     Balance  Sitting Balance: Stand by assistance (sitting EOB)  Standing Balance: Contact guard assistance (progressing to SBA at times)  Standing Balance  Time: 10 mins total  Activity: mobility into bathroom/ standing at sink, back to bedside chair  Functional Mobility  Functional - Mobility Device: Rolling Walker  Activity: To/from bathroom  Assist Level: Contact guard assistance  Functional Mobility Comments: initially guarded during mobility, requiring CGA however progressing to SBA  Toilet Transfers  Toilet - Technique: Ambulating  Equipment Used: Standard toilet  Toilet Transfer: Contact guard assistance  Toilet Transfers Comments: increased effort to stand from low surface- pt reporting his toilet at home is much higher  ADL  Feeding: Beverage management; Setup  Grooming: Contact guard assistance (standing at sink for hand hygiene)  LE Dressing: Minimal assistance (assist to thread LLE into briefs 2/2 difficulty lifting L LE.  Pt able to pull up over hips with CGA)  Toileting: Contact guard assistance  Additional Comments: Pt declined all other ADLs \"I'll do all that when my wife comes\". Pt reporting his wife is able to assist him with LB dressing at home. Bed mobility  Supine to Sit: Minimal assistance (assist to bring L LE to EOB)  Transfers  Sit to stand: Contact guard assistance  Stand to sit: Contact guard assistance     Cognition  Overall Cognitive Status: Exceptions  Arousal/Alertness: Appropriate responses to stimuli  Following Commands: Follows one step commands consistently  Memory: Appears intact  Safety Judgement: Decreased awareness of need for assistance  Problem Solving: Assistance required to generate solutions  Insights: Decreased awareness of deficits  Initiation: Requires cues for some                 LUE AROM (degrees)  LUE AROM : WFL  Left Hand AROM (degrees)  Left Hand AROM: WFL  RUE AROM (degrees)  RUE AROM : WFL  Right Hand AROM (degrees)  Right Hand AROM: WFL  LUE Strength  Gross LUE Strength: WFL  RUE Strength  Gross RUE Strength: WFL           Treatment included functional transfer training, ADLs, and patient education. Plan   Plan  Times per week: 3-5  Times per day: Daily  Current Treatment Recommendations: Strengthening, Balance Training, Functional Mobility Training, Endurance Training, Self-Care / ADL, Safety Education & Training, Equipment Evaluation, Education, & procurement          Goals  Short term goals  Time Frame for Short term goals: by 1 week unless otherwise specified  Short term goal 1: Pt will complete LB dressing with spv  Short term goal 2: Pt will complete standing level grooming with spv  Short term goal 3: Pt will complete toileting with spv by 11/22/21  Patient Goals   Patient goals : to go home       Therapy Time   Individual Concurrent Group Co-treatment   Time In 2141         Time Out 1110         Minutes 26         Timed Code Treatment Minutes: 16 Minutes (+ 10 min eval)     Aarti LORD  [address] [facility], OTR/L [id number]

## 2021-11-18 ENCOUNTER — TELEPHONE (OUTPATIENT)
Dept: ORTHOPEDIC SURGERY | Age: 71
End: 2021-11-18

## 2021-11-18 NOTE — TELEPHONE ENCOUNTER
----- Message from Maria Lai, RN sent at 11/18/2021 12:00 PM EST -----  Regarding: Pain meds  L TKR 11/16/21  Stopped taking pain meds because percocet is making pt dizzy, discussed using ultram and pt would like to do that. Used with R TKR and managed pain well.        [address] [place] [address] [phone] - F [phone]

## 2021-11-18 NOTE — TELEPHONE ENCOUNTER
Talked with patient and made sure to inform him that he was given tramadol in the hospital but he didn't like it. Patient then went to state that the other pain medication he was given made him feel \"funny\" and he did not like he. He agreed to try the ultram and a prescription for this will be put in.

## 2021-11-18 NOTE — TELEPHONE ENCOUNTER
Spoke with pt, pt having a good day. Stopped taking pain meds because percocet is making pt dizzy, discussed using ultram and pt would like to do that. Used with R TKR and managed pain well. RN Will message office. Incision status: No drainage or redness    Edema/Swelling/Teds: Swelling hasn't been bad, has been icing and elevating. Pain level and status: Managing ok, hurts when moving or walking. Use of pain medications: Using tylenol only. Blood thinner: ASA- no issues. Bowels: Taking stool softener. Not much of an appetite, but not nauseous. Home Care Agency active: NA    Outpatient therapy: Starts tomorrow. Do you have all of your medications: Yes    Changes in medications: no    Ortho Vitals: NA    Instructed pt to call Nurse Navigator or surgeon's office with any questions or concerns. Signed off from pt. Instructed pt to call RN or Surgeon's office with any issues or concerns.     Follow up appointments:    Future Appointments   Date Time Provider Suzie Hayes   11/19/2021 10:45 AM Alix Jackson PT [facility] AT [place] AND PT Nicholas Peters   11/30/2021  1:15 PM Elsy Solorio MD AND [facility]

## 2021-11-19 ENCOUNTER — HOSPITAL ENCOUNTER (OUTPATIENT)
Dept: PHYSICAL THERAPY | Age: 71
Setting detail: THERAPIES SERIES
Discharge: HOME | End: 2021-11-19
Payer: MEDICARE

## 2021-11-19 PROCEDURE — 97016 VASOPNEUMATIC DEVICE THERAPY: CPT | Performed by: PHYSICAL THERAPIST

## 2021-11-19 PROCEDURE — 97110 THERAPEUTIC EXERCISES: CPT | Performed by: PHYSICAL THERAPIST

## 2021-11-19 PROCEDURE — 97161 PT EVAL LOW COMPLEX 20 MIN: CPT | Performed by: PHYSICAL THERAPIST

## 2021-11-19 PROCEDURE — 97112 NEUROMUSCULAR REEDUCATION: CPT | Performed by: PHYSICAL THERAPIST

## 2021-11-19 NOTE — PLAN OF CARE
[facility], [address]  Phone: [phone]  Fax [phone]     Physical Therapy Certification    Dear Referring Practitioner: Dr. Zach Hull,    We had the pleasure of evaluating the following patient for physical therapy services at [address] [place] [address]. A summary of our findings can be found in the initial assessment below. This includes our plan of care. If you have any questions or concerns regarding these findings, please do not hesitate to contact me at the office phone number checked above. Thank you for the referral.       Physician Signature:_______________________________Date:__________________  By signing above (or electronic signature), therapists plan is approved by physician    Patient: Ezequiel Patterson \"ED\"  : 1950   MRN: [id number]  Referring Physician: Referring Practitioner: Dr. Zach Hull      Evaluation Date: 2021      Medical Diagnosis Information:  Diagnosis: M17.12 (ICD-10-CM) - Primary osteoarthritis of left knee s/p L TKR on 21   Treatment Diagnosis: M17.12 (ICD-10-CM) - Primary osteoarthritis of left knee s/p L TKR on 21, L knee pain M25.562                                         Insurance information: PT Insurance Information: MC/       Precautions/ Contra-indications: R TKR x 6 years ago    C-SSRS Triggered by Intake questionnaire (Past 2 wk assessment):   [x] No, Questionnaire did not trigger screening.   [] Yes, Patient intake triggered further evaluation      [] C-SSRS Screening completed  [] PCP notified via Plan of Care  [] Emergency services notified     Latex Allergy:  [x]NO      []YES  Preferred Language for Healthcare:   [x]English       []other:    SUBJECTIVE: Patient stated complaint: Reports L knee has been an issue for several years. Patient s/p L TKR at [facility] on 21. Stayed overnight. D/C to Home with wife. Using RW for ambulation. Reports no issues with getting in and out of the house using walker. Relevant Medical History: OA B knees, R TKR x6 years ago  Functional Disability Index: LEFS: 0/80 100%    Height; 6'0 Weight: 220  Pain Scale: 5-10/10  Easing factors: meds, rest, ice  Provocative factors: bending, walking,     Type: [x]Constant   []Intermittent  []Radiating []Localized []other:     Numbness/Tingling: Denies  Occupation/School: Retired    Living Status/Prior Level of Function: Independent with ADLs and IADLs,  L[place] in [place]. 3 steps to enter house without HR. Yardwork activities, walking, pickleball. OBJECTIVE:     ROM LEFT RIGHT   HIP Flex WFL WFL   HIP Abd     HIP Ext     HIP IR     HIP ER     Knee ext -3 0   Knee Flex 78 127   Ankle PF     Ankle DF     Ankle In     Ankle Ev     Strength  LEFT RIGHT   HIP Flexors Min assist with SLR 4/5   HIP Abductors 3/5     HIP Ext     Hip ER     Knee EXT (quad) fair good   Knee Flex (HS)     Ankle DF     Ankle PF     Ankle Inv     Ankle EV          Circumference  Mid Pat       47.5 cm   40.5cm      Reflexes/Sensation:    []Dermatomes/Myotomes intact    [x]Reflexes equal and normal bilaterally   []Other:    Joint mobility: NT   []Normal    []Hypo   []Hyper    Palpation: tender along medial and lateral joint line    Functional Mobility/Transfers: IND    Posture: Lacks terminal knee ext in standing L Knee. Bandages/Dressings/Incisions: Intact with Mediplex dressing . No apparent drainage noted. Gait: (include devices/WB status) Patient ambulates with RW with decreased heel to toe gait pattern. Orthopedic Special Tests: - Zahida's. [x] Patient history, allergies, meds reviewed. Medical chart reviewed. See intake form. Review Of Systems (ROS):  [x]Performed Review of systems (Integumentary, CardioPulmonary, Neurological) by intake and observation. Intake form has been scanned into medical record.  Patient has been instructed to contact their primary care physician regarding ROS issues if not already being addressed at this time. Co-morbidities/Complexities (which will affect course of rehabilitation):   []None           Arthritic conditions   []Rheumatoid arthritis (M05.9)  [x]Osteoarthritis (M19.91)   Cardiovascular conditions   []Hypertension (I10)  []Hyperlipidemia (E78.5)  []Angina pectoris (I20)  []Atherosclerosis (I70)   Musculoskeletal conditions   []Disc pathology   []Congenital spine pathologies   []Prior surgical intervention  []Osteoporosis (M81.8)  []Osteopenia (M85.8)   Endocrine conditions   []Hypothyroid (E03.9)  []Hyperthyroid Gastrointestinal conditions   []Constipation (T39.28)   Metabolic conditions   []Morbid obesity (E66.01)  []Diabetes type 1(E10.65) or 2 (E11.65)   []Neuropathy (G60.9)     Pulmonary conditions   []Asthma (J45)  []Coughing   []COPD (J44.9)   Psychological Disorders  []Anxiety (F41.9)  []Depression (F32.9)   []Other:   []Other:          Barriers to/and or personal factors that will affect rehab potential:              []Age  []Sex              []Motivation/Lack of Motivation                        [x]Co-Morbidities              []Cognitive Function, education/learning barriers              []Environmental, home barriers              []profession/work barriers  []past PT/medical experience  []other:  Justification:     Falls Risk Assessment (30 days):   [x] Falls Risk assessed and no intervention required. [] Falls Risk assessed and Patient requires intervention due to being higher risk   TUG score (>12s at risk):     [] Falls education provided, including           ASSESSMENT: Patient s/p L TKR on 11/16/21. Presents with pain, limited ROM, strength and functional mobility as expected s/p post-op TKR. Pt requires skilled intervention to restore ROM, strength, functional endurance and balance in order to perform ADLs without significant symptoms or limitations.   Functional Impairments:     []Noted lumbar/proximal hip/LE joint hypomobility   [x]Decreased LE functional ROM   [x]Decreased core/proximal hip strength and neuromuscular control   [x]Decreased LE functional strength   []Reduced balance/proprioceptive control   []other:      Functional Activity Limitations (from functional questionnaire and intake)   [x]Reduced ability to tolerate prolonged functional positions   []Reduced ability or difficulty with changes of positions or transfers between positions   []Reduced ability to maintain good posture and demonstrate good body mechanics with sitting, bending, and lifting   [x]Reduced ability to sleep   [] Reduced ability or tolerance with driving and/or computer work   []Reduced ability to perform lifting, carrying tasks   [x]Reduced ability to squat   []Reduced ability to forward bend   [x]Reduced ability to ambulate prolonged functional periods/distances/surfaces   [x]Reduced ability to ascend/descend stairs   []Reduced ability to run, hop, cut or jump   []other:    Participation Restrictions   [x]Reduced participation in self care activities   [x]Reduced participation in home management activities   []Reduced participation in work activities   [x]Reduced participation in social activities. []Reduced participation in sport/recreation activities. Classification :    [x]Signs/symptoms consistent with post-surgical status including decreased ROM, strength and function.    []Signs/symptoms consistent with joint sprain/strain   []Signs/symptoms consistent with patella-femoral syndrome   []Signs/symptoms consistent with knee OA/hip OA   []Signs/symptoms consistent with internal derangement of knee/Hip   []Signs/symptoms consistent with functional hip weakness/NMR control      []Signs/symptoms consistent with tendinitis/tendinosis    []signs/symptoms consistent with pathology which may benefit from Dry needling      []other:      Prognosis/Rehab Potential: []Excellent   [x]Good    []Fair   []Poor    Tolerance of evaluation/treatment:    []Excellent   [x]Good    []Fair   []Poor  Physical Therapy Evaluation Complexity Justification  [x] A history of present problem with:  [] no personal factors and/or comorbidities that impact the plan of care;  [x]1-2 personal factors and/or comorbidities that impact the plan of care  []3 personal factors and/or comorbidities that impact the plan of care  [x] An examination of body systems using standardized tests and measures addressing any of the following: body structures and functions (impairments), activity limitations, and/or participation restrictions;:  [] a total of 1-2 or more elements   [x] a total of 3 or more elements   [] a total of 4 or more elements   [x] A clinical presentation with:  [x] stable and/or uncomplicated characteristics   [] evolving clinical presentation with changing characteristics  [] unstable and unpredictable characteristics;   [x] Clinical decision making of [x] low, [] moderate, [] high complexity using standardized patient assessment instrument and/or measurable assessment of functional outcome. [x] EVAL (LOW) 05695 (typically 20 minutes face-to-face)  [] EVAL (MOD) 79241 (typically 30 minutes face-to-face)  [] EVAL (HIGH) 04854 (typically 45 minutes face-to-face)  [] RE-EVAL       PLAN:    Frequency/Duration:  2-3 days per week for 12 Weeks:  Interventions:  [x]  Therapeutic exercise including: strength training, ROM, for Lower extremity and core   [x]  NMR activation and proprioception for LE, Glutes and Core   [x]  Manual therapy as indicated for LE, Hip and spine to include: Dry Needling/IASTM, STM, PROM, Gr I-IV mobilizations, manipulation. [x] Modalities as needed that may include: thermal agents, E-stim, Biofeedback, US, iontophoresis as indicated  [x] Patient education on joint protection, postural re-education, activity modification, progression of HEP.     HEP instruction: (see scanned forms)  Access Code: [email or web address]  URL: [email or web address]  Date: 11/19/2021  Prepared by: Dot Herr    Exercises  Long Sitting Ankle Pumps - 5-7 x daily - 7 x weekly - 3 sets - 10 reps - 1-2 hold  Long Sitting Quad Set - 5-7 x daily - 7 x weekly - 1 sets - 10 reps - 10 hold  Long Sitting Calf Stretch with Strap - 2-3 x daily - 7 x weekly - 1 sets - 5 reps - 30\" hold  Seated Table Hamstring Stretch - 2-3 x daily - 7 x weekly - 1 sets - 5 reps - 30\" hold  Supine Straight Leg Raises - 2-3 x daily - 7 x weekly - 2 sets - 10 reps - 5 hold  Side Leg Lifts - 2-3 x daily - 7 x weekly - 2 sets - 10 reps - 5\" hold  Sitting Heel Slide with Towel - 2-3 x daily - 7 x weekly - 1 sets - 10 reps - 10\" hold  Supine Heel Slide with Strap - 2-3 x daily - 7 x weekly - 3 sets - 10 reps - 10\" hold    GOALS:  Patient stated goal: Get rid of pain  [] Progressing: [] Met: [] Not Met: [] Adjusted    Therapist goals for Patient:   Short Term Goals: To be achieved in: 2 weeks  1. Independent in HEP and progression per patient tolerance, in order to prevent re-injury. [] Progressing: [] Met: [] Not Met: [] Adjusted  2. Patient will have a decrease in pain to facilitate improvement in movement, function, and ADLs as indicated by Functional Deficits. [] Progressing: [] Met: [] Not Met: [] Adjusted    Long Term Goals: To be achieved in: 12 weeks  1. Disability index score of 45% or less for the LEFS to assist with reaching prior level of function. [] Progressing: [] Met: [] Not Met: [] Adjusted  2. Patient will demonstrate increased AROM to 0-130 to allow for proper joint functioning as indicated by patients Functional Deficits. [] Progressing: [] Met: [] Not Met: [] Adjusted  3. Patient will demonstrate an increase in Strength to good proximal hip strength and control, within 5lb HHD in LE to allow for proper functional mobility as indicated by patients Functional Deficits.  [] Progressing: [] Met: [] Not Met: [] Adjusted  4. Patient will return to walking with normalized gait pattern with or without appropriate assistive device functional activities without increased symptoms or restriction. [] Progressing: [] Met: [] Not Met: [] Adjusted  5.  Reciprocal gait with stairs without pain(patient specific functional goal)    [] Progressing: [] Met: [] Not Met: [] Adjusted     Electronically signed by:  Enmanuel Mora, [address] [place] [address]

## 2021-11-19 NOTE — FLOWSHEET NOTE
[facility], [address]  Phone: [phone]  Fax [phone]    Physical Therapy Treatment Note/ Progress Report:           Date:  2021    Patient Name:  Ezequiel Lopez    :  1950  MRN: [id number]  Restrictions/Precautions:  R TKR x 6 years ago. Medical/Treatment Diagnosis Information:  · Diagnosis: M17.12 (ICD-10-CM) - Primary osteoarthritis of left knee s/p L TKR on 21  · Treatment Diagnosis: M17.12 (ICD-10-CM) - Primary osteoarthritis of left knee s/p L TKR on 21, L knee pain M10.300  Insurance/Certification information:  PT Insurance Information: /  Physician Information:  Referring Practitioner: Dr. Zach Hull  Has the plan of care been signed (Y/N):        []  Yes  [x]  No     Date of Patient follow up with Physician:  21      Is this a Progress Report:     []  Yes  [x]  No        If Yes:  Date Range for reporting period:  Beginning; 21  Ending; 21    Progress report will be due (10 Rx or 30 days whichever is less):        Recertification will be due (POC Duration  / 90 days whichever is less): 12 weeks      Visit # Insurance Allowable Auth Required   In-person 1 /Sean []  Yes ? []  No    Telehealth   []  Yes []  No    Total          Functional Scale: LEFS: 0/80 100%   Date assessed: 21      Therapy Diagnosis/Practice Pattern: 4H      Number of Comorbidities:  []0     [x]1-2    []3    Latex Allergy:  [x]NO      []YES  Preferred Language for Healthcare:   [x]English       []other:      Pain level:  5-10/10     SUBJECTIVE:  See eval    OBJECTIVE: See eval. Patient educated to wear stockings for swelling/prevention. Wife to put on stocking when they get home.  Observation:    Test measurements: 78 knee flexion this visit.       RESTRICTIONS/PRECAUTIONS: R TKR x6-7 years ago    Exercises/Interventions:     Therapeutic Ex (73774) Sets/sec Reps Notes/CUES   QS, GS AP  10 reps HEP   Heelslides in sitting and supine H10 10 reps HEP         SLR with min assist  10 reps HEP   Hip abduction H5 10 reps HEP   Sitting HS stretches H30 5 HEP   Sitting GS  Stretches with strap H30 5 HEP                                 Manual Intervention (14865)            Patellar mobs 5'                             NMR re-education (67463)   CUES NEEDED         Patient education/HEP/ gait and stair training 5'                                               Therapeutic Activity (87842)                                            HEP instruction: (see scanned forms)  Access Code: [email or web address]  URL: [email or web address]  Date: 11/19/2021  Prepared by: Rica Mcburney     Exercises  Long Sitting Ankle Pumps - 5-7 x daily - 7 x weekly - 3 sets - 10 reps - 1-2 hold  Long Sitting Quad Set - 5-7 x daily - 7 x weekly - 1 sets - 10 reps - 10 hold  Long Sitting Calf Stretch with Strap - 2-3 x daily - 7 x weekly - 1 sets - 5 reps - 30\" hold  Seated Table Hamstring Stretch - 2-3 x daily - 7 x weekly - 1 sets - 5 reps - 30\" hold  Supine Straight Leg Raises - 2-3 x daily - 7 x weekly - 2 sets - 10 reps - 5 hold  Side Leg Lifts - 2-3 x daily - 7 x weekly - 2 sets - 10 reps - 5\" hold  Sitting Heel Slide with Towel - 2-3 x daily - 7 x weekly - 1 sets - 10 reps - 10\" hold  Supine Heel Slide with Strap - 2-3 x daily - 7 x weekly - 3 sets - 10 reps - 10\" hold    Therapeutic Exercise and NMR EXR  [x] (21261) Provided verbal/tactile cueing for activities related to strengthening, flexibility, endurance, ROM for improvements in LE, proximal hip, and core control with self care, mobility, lifting, ambulation.  [] (60575) Provided verbal/tactile cueing for activities related to improving balance, coordination, kinesthetic sense, posture, motor skill, proprioception  to assist with LE, proximal hip, and core control in self care, mobility, lifting, ambulation and eccentric single leg control.      NMR and Therapeutic Activities:    [x] (64428 or 07167) Provided verbal/tactile cueing for activities related to improving balance, coordination, kinesthetic sense, posture, motor skill, proprioception and motor activation to allow for proper function of core, proximal hip and LE with self care and ADLs  [] (73907) Gait Re-education- Provided training and instruction to the patient for proper LE, core and proximal hip recruitment and positioning and eccentric body weight control with ambulation re-education including up and down stairs     Home Exercise Program:    [x] (96661) Reviewed/Progressed HEP activities related to strengthening, flexibility, endurance, ROM of core, proximal hip and LE for functional self-care, mobility, lifting and ambulation/stair navigation   [] (96379)Reviewed/Progressed HEP activities related to improving balance, coordination, kinesthetic sense, posture, motor skill, proprioception of core, proximal hip and LE for self care, mobility, lifting, and ambulation/stair navigation      Manual Treatments:  PROM / STM / Oscillations-Mobs:  G-I, II, III, IV (PA's, Inf., Post.)  [x] (40456) Provided manual therapy to mobilize LE, proximal hip and/or LS spine soft tissue/joints for the purpose of modulating pain, promoting relaxation,  increasing ROM, reducing/eliminating soft tissue swelling/inflammation/restriction, improving soft tissue extensibility and allowing for proper ROM for normal function with self care, mobility, lifting and ambulation. Modalities:   x15 min to L knee after treatment   [x] GAME READY (VASO)- for significant edema, swelling, pain control.      Charges  Timed Code Treatment Minutes: 30'   Total Treatment Minutes: 61'     Medicare total (add KX at $2000)  172.00         [x] EVAL (LOW) 86771   [] EVAL (MOD) 50390  [] EVAL (HIGH) 80027   [] RE-EVAL     [x] QE(93012) x 1    [] IONTO  [x] NMR (58671) x 1    [x] VASO  [] Manual (05397) x      [] Other:  [] TA x      [] Luis F Purpura Traction (19046)  [] ES(attended) (34969)      [] ES (un) (33479):         GOALS:  Patient stated goal: Get rid of pain  []? Progressing: []? Met: []? Not Met: []? Adjusted     Therapist goals for Patient:   Short Term Goals: To be achieved in: 2 weeks  1. Independent in HEP and progression per patient tolerance, in order to prevent re-injury. []? Progressing: []? Met: []? Not Met: []? Adjusted  2. Patient will have a decrease in pain to facilitate improvement in movement, function, and ADLs as indicated by Functional Deficits. []? Progressing: []? Met: []? Not Met: []? Adjusted     Long Term Goals: To be achieved in: 12 weeks  1. Disability index score of 45% or less for the LEFS to assist with reaching prior level of function. []? Progressing: []? Met: []? Not Met: []? Adjusted  2. Patient will demonstrate increased AROM to 0-130 to allow for proper joint functioning as indicated by patients Functional Deficits. []? Progressing: []? Met: []? Not Met: []? Adjusted  3. Patient will demonstrate an increase in Strength to good proximal hip strength and control, within 5lb HHD in LE to allow for proper functional mobility as indicated by patients Functional Deficits. []? Progressing: []? Met: []? Not Met: []? Adjusted  4. Patient will return to walking with normalized gait pattern with or without appropriate assistive device functional activities without increased symptoms or restriction. []? Progressing: []? Met: []? Not Met: []? Adjusted  5. Reciprocal gait with stairs without pain(patient specific functional goal)    []? Progressing: []? Met: []? Not Met: []? Adjusted         Progression Towards Functional goals:  [] Patient is progressing as expected towards functional goals listed. [] Progression is slowed due to complexities listed. [] Progression has been slowed due to co-morbidities.   [x] Plan just implemented, too soon to assess goals progression  [] Other:         Overall Progression Towards Functional goals/ Treatment Progress Update:  [] Patient is progressing as expected towards functional goals listed. [] Progression is slowed due to complexities/Impairments listed. [] Progression has been slowed due to co-morbidities. [x] Plan just implemented, too soon to assess goals progression <30days   [] Goals require adjustment due to lack of progress  [] Patient is not progressing as expected and requires additional follow up with physician  [] Other    Prognosis for POC: [x] Good [] Fair  [] Poor      Patient requires continued skilled intervention: [x] Yes  [] No    Treatment/Activity Tolerance:  [x] Patient able to complete treatment  [] Patient limited by fatigue  [] Patient limited by pain    [] Patient limited by other medical complications  [] Other:     ASSESSMENT: Patient s/p L TKR on 11/16/21. Presents with pain, limited ROM, strength and functional mobility as expected s/p post-op TKR. Pt requires skilled intervention to restore ROM, strength, functional endurance and balance in order to perform ADLs without significant symptoms or limitations. Return to Play: (if applicable)   []  Stage 1: Intro to Strength   []  Stage 2: Return to Run and Strength   []  Stage 3: Return to Jump and Strength   []  Stage 4: Dynamic Strength and Agility   []  Stage 5: Sport Specific Training     []  Ready to Return to Play, Meets All Above Stages   []  Not Ready for Return to Sports   Comments:                               PLAN: See devyn. Cont PT 2-3x/week. Progress ROM/functional mobility as tolerated. [x] Continue per plan of care [] Alter current plan (see comments above)  [] Plan of care initiated [] Hold pending MD visit [] Discharge      Electronically signed by:  Fozia Klein, [address]    Note: If patient does not return for scheduled/ recommended follow up visits, this note will serve as a discharge from care along with most recent update on progress.

## 2021-11-22 ENCOUNTER — HOSPITAL ENCOUNTER (OUTPATIENT)
Dept: PHYSICAL THERAPY | Age: 71
Setting detail: THERAPIES SERIES
Discharge: HOME | End: 2021-11-22
Payer: MEDICARE

## 2021-11-22 ENCOUNTER — TELEPHONE (OUTPATIENT)
Dept: ORTHOPEDIC SURGERY | Age: 71
End: 2021-11-22

## 2021-11-22 DIAGNOSIS — G89.18 POST-OP PAIN: Primary | ICD-10-CM

## 2021-11-22 PROCEDURE — 97112 NEUROMUSCULAR REEDUCATION: CPT | Performed by: PHYSICAL THERAPIST

## 2021-11-22 PROCEDURE — 97110 THERAPEUTIC EXERCISES: CPT | Performed by: PHYSICAL THERAPIST

## 2021-11-22 PROCEDURE — 97016 VASOPNEUMATIC DEVICE THERAPY: CPT | Performed by: PHYSICAL THERAPIST

## 2021-11-22 RX ORDER — TRAMADOL HYDROCHLORIDE 50 MG/1
50 TABLET ORAL EVERY 8 HOURS PRN
Qty: 28 TABLET | Refills: 0 | Status: SHIPPED | OUTPATIENT
Start: 2021-11-22 | End: 2021-12-02

## 2021-11-22 NOTE — PROGRESS NOTES
Tramadol prescription sent to pharmacy, to be taken in place of oxycodone. Dr. Hettie Dakin informed.     Jacky Mak DO   11/22/21 9:48 AM

## 2021-11-22 NOTE — FLOWSHEET NOTE
[facility], [address]  Phone: [phone]  Fax [phone]    Physical Therapy Treatment Note/ Progress Report:           Date:  2021    Patient Name:  Blaire Mcconnell    :  1950  MRN: [id number]  Restrictions/Precautions:  R TKR x 6 years ago. Medical/Treatment Diagnosis Information:  · Diagnosis: M17.12 (ICD-10-CM) - Primary osteoarthritis of left knee s/p L TKR on 21  · Treatment Diagnosis: M17.12 (ICD-10-CM) - Primary osteoarthritis of left knee s/p L TKR on 21, L knee pain H08.465  Insurance/Certification information:  PT Insurance Information: /  Physician Information:  Referring Practitioner: Dr. Stephan Velarde  Has the plan of care been signed (Y/N):        []  Yes  [x]  No     Date of Patient follow up with Physician:  21      Is this a Progress Report:     []  Yes  [x]  No        If Yes:  Date Range for reporting period:  Beginning; 21  Ending; 21    Progress report will be due (10 Rx or 30 days whichever is less):        Recertification will be due (POC Duration  / 90 days whichever is less): 12 weeks      Visit # Insurance Allowable Auth Required   In-person 2 / []  Yes ? []  No    Telehealth   []  Yes []  No    Total          Functional Scale: LEFS: 0/80 100%   Date assessed: 21      Therapy Diagnosis/Practice Pattern: 4H      Number of Comorbidities:  []0     [x]1-2    []3    Latex Allergy:  [x]NO      []YES  Preferred Language for Healthcare:   [x]English       []other:      Pain level:  5-10/10     SUBJECTIVE:  Reports no new issues. Brought Bandage in to change dressing. OBJECTIVE: See eval. Patient educated to wear stockings for swelling/prevention. Wife to put on stocking when they get home. Bandage changed. Placed steri-strips over scabbed part of incisions. Healing well. No signs of infection or drainage.    Observation:  Test measurements: 90-92 knee flexion this visit. RESTRICTIONS/PRECAUTIONS: R TKR x6-7 years ago    Exercises/Interventions:     Therapeutic Ex (78126) Sets/sec Reps Notes/CUES   QS, GS AP  10 reps HEP   Heelslides in sitting and supine H10 10 reps 90-92 deg HEP         SLR with min assist  2x10 reps HEP   Hip abduction H5 10 reps HEP   Sitting HS stretches H30 5 HEP   Sitting GS  Stretches with strap H30 5 HEP         EZ chair for flexion 5'  78 on remote and 88 with goniometer. -2 on remote with extension   Bike for ROM 5'  90-92 deg               Manual Intervention (58629)            Patellar mobs 5'                             NMR re-education (41895)   CUES NEEDED         Patient education/HEP/ gait and stair training 5'           Dressing changed X 10'                                  Therapeutic Activity (45837)                                            HEP instruction: (see scanned forms)  Access Code: [email or web address]  URL: [email or web address]  Date: 11/19/2021  Prepared by: Richelle Morgan     Exercises  Long Sitting Ankle Pumps - 5-7 x daily - 7 x weekly - 3 sets - 10 reps - 1-2 hold  Long Sitting Quad Set - 5-7 x daily - 7 x weekly - 1 sets - 10 reps - 10 hold  Long Sitting Calf Stretch with Strap - 2-3 x daily - 7 x weekly - 1 sets - 5 reps - 30\" hold  Seated Table Hamstring Stretch - 2-3 x daily - 7 x weekly - 1 sets - 5 reps - 30\" hold  Supine Straight Leg Raises - 2-3 x daily - 7 x weekly - 2 sets - 10 reps - 5 hold  Side Leg Lifts - 2-3 x daily - 7 x weekly - 2 sets - 10 reps - 5\" hold  Sitting Heel Slide with Towel - 2-3 x daily - 7 x weekly - 1 sets - 10 reps - 10\" hold  Supine Heel Slide with Strap - 2-3 x daily - 7 x weekly - 3 sets - 10 reps - 10\" hold    Therapeutic Exercise and NMR EXR  [x] (03790) Provided verbal/tactile cueing for activities related to strengthening, flexibility, endurance, ROM for improvements in LE, proximal hip, and core control with self care, mobility, lifting, ambulation.  [] (21428) Provided verbal/tactile cueing for activities related to improving balance, coordination, kinesthetic sense, posture, motor skill, proprioception  to assist with LE, proximal hip, and core control in self care, mobility, lifting, ambulation and eccentric single leg control. NMR and Therapeutic Activities:    [x] (27100 or 79668) Provided verbal/tactile cueing for activities related to improving balance, coordination, kinesthetic sense, posture, motor skill, proprioception and motor activation to allow for proper function of core, proximal hip and LE with self care and ADLs  [] (36115) Gait Re-education- Provided training and instruction to the patient for proper LE, core and proximal hip recruitment and positioning and eccentric body weight control with ambulation re-education including up and down stairs     Home Exercise Program:    [x] (60177) Reviewed/Progressed HEP activities related to strengthening, flexibility, endurance, ROM of core, proximal hip and LE for functional self-care, mobility, lifting and ambulation/stair navigation   [] (96410)Reviewed/Progressed HEP activities related to improving balance, coordination, kinesthetic sense, posture, motor skill, proprioception of core, proximal hip and LE for self care, mobility, lifting, and ambulation/stair navigation      Manual Treatments:  PROM / STM / Oscillations-Mobs:  G-I, II, III, IV (PA's, Inf., Post.)  [x] (22531) Provided manual therapy to mobilize LE, proximal hip and/or LS spine soft tissue/joints for the purpose of modulating pain, promoting relaxation,  increasing ROM, reducing/eliminating soft tissue swelling/inflammation/restriction, improving soft tissue extensibility and allowing for proper ROM for normal function with self care, mobility, lifting and ambulation. Modalities:   x15 min to L knee after treatment   [x] GAME READY (VASO)- for significant edema, swelling, pain control.      Charges  Timed Code Treatment Minutes: 39'   Total Treatment Minutes: 61'     Medicare total (add KX at $2000)  281.00         [] EVAL (LOW) 66462   [] EVAL (MOD) 13303  [] EVAL (HIGH) 05208   [] RE-EVAL     [x] ZU(30876) x 2    [] IONTO  [x] NMR (48783) x 1    [x] VASO  [] Manual (61163) x      [] Other:  [] TA x      [] Mech Traction (47760)  [] ES(attended) (85038)      [] ES (un) (77808):         GOALS:  Patient stated goal: Get rid of pain  []? Progressing: []? Met: []? Not Met: []? Adjusted     Therapist goals for Patient:   Short Term Goals: To be achieved in: 2 weeks   1. Independent in HEP and progression per patient tolerance, in order to prevent re-injury. []? Progressing: []? Met: []? Not Met: []? Adjusted  2. Patient will have a decrease in pain to facilitate improvement in movement, function, and ADLs as indicated by Functional Deficits. []? Progressing: []? Met: []? Not Met: []? Adjusted     Long Term Goals: To be achieved in: 12 weeks  1. Disability index score of 45% or less for the LEFS to assist with reaching prior level of function. []? Progressing: []? Met: []? Not Met: []? Adjusted  2. Patient will demonstrate increased AROM to 0-130 to allow for proper joint functioning as indicated by patients Functional Deficits. []? Progressing: []? Met: []? Not Met: []? Adjusted  3. Patient will demonstrate an increase in Strength to good proximal hip strength and control, within 5lb HHD in LE to allow for proper functional mobility as indicated by patients Functional Deficits. []? Progressing: []? Met: []? Not Met: []? Adjusted  4. Patient will return to walking with normalized gait pattern with or without appropriate assistive device functional activities without increased symptoms or restriction. []? Progressing: []? Met: []? Not Met: []? Adjusted  5. Reciprocal gait with stairs without pain(patient specific functional goal)    []? Progressing: []? Met: []? Not Met: []?  Adjusted         Progression Towards Functional goals:  [] Patient is progressing as expected towards functional goals listed. [] Progression is slowed due to complexities listed. [] Progression has been slowed due to co-morbidities. [x] Plan just implemented, too soon to assess goals progression  [] Other:         Overall Progression Towards Functional goals/ Treatment Progress Update:  [] Patient is progressing as expected towards functional goals listed. [] Progression is slowed due to complexities/Impairments listed. [] Progression has been slowed due to co-morbidities. [x] Plan just implemented, too soon to assess goals progression <30days   [] Goals require adjustment due to lack of progress  [] Patient is not progressing as expected and requires additional follow up with physician  [] Other    Prognosis for POC: [x] Good [] Fair  [] Poor      Patient requires continued skilled intervention: [x] Yes  [] No    Treatment/Activity Tolerance:  [x] Patient able to complete treatment  [] Patient limited by fatigue  [] Patient limited by pain    [] Patient limited by other medical complications  [] Other:     ASSESSMENT: Patient s/p L TKR on 11/16/21. Presents with pain, limited ROM, strength and functional mobility as expected s/p post-op TKR. Increased AROM noted this visit. Pt requires skilled intervention to restore ROM, strength, functional endurance and balance in order to perform ADLs without significant symptoms or limitations. Return to Play: (if applicable)   []  Stage 1: Intro to Strength   []  Stage 2: Return to Run and Strength   []  Stage 3: Return to Jump and Strength   []  Stage 4: Dynamic Strength and Agility   []  Stage 5: Sport Specific Training     []  Ready to Return to Play, Meets All Above Stages   []  Not Ready for Return to Sports   Comments:                               PLAN: See eval. Cont PT 2-3x/week. Progress ROM/functional mobility as tolerated.   [x] Continue per plan of care [] Alter current plan (see comments above)  [] Plan of care initiated [] Hold pending MD visit [] Discharge      Electronically signed by:  Fozia Klein, [address]    Note: If patient does not return for scheduled/ recommended follow up visits, this note will serve as a discharge from care along with most recent update on progress.

## 2021-11-22 NOTE — TELEPHONE ENCOUNTER
Called patient to let him know that the medication was called in and he can pick it up at the [facility].

## 2021-11-24 ENCOUNTER — HOSPITAL ENCOUNTER (OUTPATIENT)
Dept: PHYSICAL THERAPY | Age: 71
Setting detail: THERAPIES SERIES
Discharge: HOME | End: 2021-11-24
Payer: MEDICARE

## 2021-11-24 PROCEDURE — 97016 VASOPNEUMATIC DEVICE THERAPY: CPT | Performed by: PHYSICAL THERAPIST

## 2021-11-24 PROCEDURE — 97112 NEUROMUSCULAR REEDUCATION: CPT | Performed by: PHYSICAL THERAPIST

## 2021-11-24 PROCEDURE — 97110 THERAPEUTIC EXERCISES: CPT | Performed by: PHYSICAL THERAPIST

## 2021-11-24 NOTE — FLOWSHEET NOTE
[facility],  [address]  Phone: [phone]  Fax [phone]    Physical Therapy Treatment Note/ Progress Report:           Date:  2021    Patient Name:  Jony Muir    :  1950  MRN: [id number]  Restrictions/Precautions:  R TKR x 6 years ago. Medical/Treatment Diagnosis Information:  · Diagnosis: M17.12 (ICD-10-CM) - Primary osteoarthritis of left knee s/p L TKR on 21  · Treatment Diagnosis: M17.12 (ICD-10-CM) - Primary osteoarthritis of left knee s/p L TKR on 21, L knee pain X94.470  Insurance/Certification information:  PT Insurance Information: [facility]/  Physician Information:  Referring Practitioner: Dr. Juana Diez  Has the plan of care been signed (Y/N):        []  Yes  [x]  No     Date of Patient follow up with Physician:  21      Is this a Progress Report:     []  Yes  [x]  No        If Yes:  Date Range for reporting period:  Beginning; 21  Ending; 21     Progress report will be due (10 Rx or 30 days whichever is less):        Recertification will be due (POC Duration  / 90 days whichever is less): 12 weeks      Visit # Insurance Allowable Auth Required   In-person 3 [facility]/ []  Yes ? []  No    Telehealth   []  Yes []  No    Total          Functional Scale: LEFS: 0/80 100%   Date assessed: 21      Therapy Diagnosis/Practice Pattern: 4H      Number of Comorbidities:  []0     [x]1-2    []3    Latex Allergy:  [x]NO      []YES  Preferred Language for Healthcare:   [x]English       []other:      Pain level:  5-10/10     SUBJECTIVE:  Reports no new issues. Taking pain meds as needed. OBJECTIVE: See eval. Patient educated to wear stockings for swelling/prevention. Still not wearing stockings at home from post-op.  Observation:    Test measurements: 95  knee flexion this visit.       RESTRICTIONS/PRECAUTIONS: R TKR x6-7 years ago Exercises/Interventions: Progressed HEP    Therapeutic Ex (71838) Sets/sec Reps Notes/CUES   QS, GS AP  10 reps HEP   Heelslides in sitting and supine with strap H10 10 reps  deg HEP         SLR with min assist  2x10 reps HEP   Hip abduction H5 10 reps HEP   Sitting HS stretches H30 5 HEP   Sitting GS  Stretches with strap H30 5 HEP         EZ chair for flexion 5'  82 on remote and 96 with goniometer. 0 on remote with extension   Bike for ROM 6'  94 deg   Incline stretches H30 5x    Bridges with GTB H5 2x10 reps + to HEP   SL clams with GTB H5 2x10 reps + to HEP   Standing HR  2x10 reps    Manual Intervention (87512)            Patellar mobs 5'                             NMR re-education (10979)   CUES NEEDED         Patient education/HEP/ gait and stair training 5'                                               Therapeutic Activity (18018)            FSU 4\"  2x10 reps                              HEP instruction: (see scanned forms)  Access Code: [email or web address]  URL: [email or web address]  Date: 11/19/2021  Prepared by: Fozia Perish     Exercises  Long Sitting Ankle Pumps - 5-7 x daily - 7 x weekly - 3 sets - 10 reps - 1-2 hold  Long Sitting Quad Set - 5-7 x daily - 7 x weekly - 1 sets - 10 reps - 10 hold  Long Sitting Calf Stretch with Strap - 2-3 x daily - 7 x weekly - 1 sets - 5 reps - 30\" hold  Seated Table Hamstring Stretch - 2-3 x daily - 7 x weekly - 1 sets - 5 reps - 30\" hold  Supine Straight Leg Raises - 2-3 x daily - 7 x weekly - 2 sets - 10 reps - 5 hold  Side Leg Lifts - 2-3 x daily - 7 x weekly - 2 sets - 10 reps - 5\" hold  Sitting Heel Slide with Towel - 2-3 x daily - 7 x weekly - 1 sets - 10 reps - 10\" hold  Supine Heel Slide with Strap - 2-3 x daily - 7 x weekly - 3 sets - 10 reps - 10\" hold     Access Code: [email or web address]  URL: [email or web address]/  Date: 11/24/2021  Prepared by: Fozia Perish    Exercises  Supine Bridge with Resistance Band - 2 x daily - 7 x weekly - 3 sets - 10 reps - 5 without appropriate assistive device functional activities without increased symptoms or restriction. []? Progressing: []? Met: []? Not Met: []? Adjusted  5. Reciprocal gait with stairs without pain(patient specific functional goal)    []? Progressing: []? Met: []? Not Met: []? Adjusted         Progression Towards Functional goals:  [] Patient is progressing as expected towards functional goals listed. [] Progression is slowed due to complexities listed. [] Progression has been slowed due to co-morbidities. [x] Plan just implemented, too soon to assess goals progression  [] Other:         Overall Progression Towards Functional goals/ Treatment Progress Update:  [] Patient is progressing as expected towards functional goals listed. [] Progression is slowed due to complexities/Impairments listed. [] Progression has been slowed due to co-morbidities. [x] Plan just implemented, too soon to assess goals progression <30days   [] Goals require adjustment due to lack of progress  [] Patient is not progressing as expected and requires additional follow up with physician  [] Other    Prognosis for POC: [x] Good [] Fair  [] Poor      Patient requires continued skilled intervention: [x] Yes  [] No    Treatment/Activity Tolerance:  [x] Patient able to complete treatment  [] Patient limited by fatigue  [] Patient limited by pain    [] Patient limited by other medical complications  [] Other:     ASSESSMENT: Patient s/p L TKR on 11/16/21. Presents with pain, limited ROM, strength and functional mobility as expected s/p post-op TKR. Increased AROM noted this visit. Pt requires skilled intervention to restore ROM, strength, functional endurance and balance in order to perform ADLs without significant symptoms or limitations.     Return to Play: (if applicable)   []  Stage 1: Intro to Strength   []  Stage 2: Return to Run and Strength   []  Stage 3: Return to Jump and Strength   []  Stage 4: Dynamic Strength and Agility   []  Stage 5: Sport Specific Training     []  Ready to Return to Play, Meets All Above Stages   []  Not Ready for Return to Sports   Comments:                               PLAN: See eval. Cont PT 2-3x/week. Progress ROM/functional mobility as tolerated. [x] Continue per plan of care [] Alter current plan (see comments above)  [] Plan of care initiated [] Hold pending MD visit [] Discharge      Electronically signed by:  Rica Mcburney, [address]    Note: If patient does not return for scheduled/ recommended follow up visits, this note will serve as a discharge from care along with most recent update on progress.

## 2021-11-29 ENCOUNTER — HOSPITAL ENCOUNTER (OUTPATIENT)
Dept: PHYSICAL THERAPY | Age: 71
Setting detail: THERAPIES SERIES
Discharge: HOME | End: 2021-11-29
Payer: MEDICARE

## 2021-11-29 PROCEDURE — 97110 THERAPEUTIC EXERCISES: CPT | Performed by: PHYSICAL THERAPIST

## 2021-11-29 PROCEDURE — 97016 VASOPNEUMATIC DEVICE THERAPY: CPT | Performed by: PHYSICAL THERAPIST

## 2021-11-29 PROCEDURE — 97112 NEUROMUSCULAR REEDUCATION: CPT | Performed by: PHYSICAL THERAPIST

## 2021-11-29 NOTE — FLOWSHEET NOTE
knee flexion this visit on bike. RESTRICTIONS/PRECAUTIONS: R TKR x6-7 years ago     Exercises/Interventions: Progressed HEP    Therapeutic Ex (46085) Sets/sec Reps Notes/CUES   QS, GS AP  10 reps HEP   Heelslides in sitting and supine with strap H10 10 reps  deg HEP         SLR with min assist HEP   Hip abduction HEP   Sitting HS stretches H30 5 HEP   Sitting GS  Stretches with strap H30 5 HEP         EZ chair for flexion 5'  94 on remote and 108 with goniometer. 0 on remote with extension   Bike for ROM 6'  105 deg   Incline stretches H30 5x    Bridges with GTB + to HEP   SL clams with GTB + to HEP   Side stepping with LVL  2 laps    Standing HR  2x10 reps    Manual Intervention (95371)            Patellar mobs 5'                             NMR re-education (65225)   CUES NEEDED         Patient education/HEP/ gait and stair training 5'                 TKE with pilates reformer in standing with yellow spring  2x10 reps                            Therapeutic Activity (75294)            FSU 4\"            FSU up and over 4\"  1x10 reps    SL stance H10 3 reps            HEP instruction: (see scanned forms)  Access Code: [email or web address]  URL: [email or web address]  Date: 11/19/2021  Prepared by: Ramírez Robison     Exercises  Long Sitting Ankle Pumps - 5-7 x daily - 7 x weekly - 3 sets - 10 reps - 1-2 hold  Long Sitting Quad Set - 5-7 x daily - 7 x weekly - 1 sets - 10 reps - 10 hold  Long Sitting Calf Stretch with Strap - 2-3 x daily - 7 x weekly - 1 sets - 5 reps - 30\" hold  Seated Table Hamstring Stretch - 2-3 x daily - 7 x weekly - 1 sets - 5 reps - 30\" hold  Supine Straight Leg Raises - 2-3 x daily - 7 x weekly - 2 sets - 10 reps - 5 hold  Side Leg Lifts - 2-3 x daily - 7 x weekly - 2 sets - 10 reps - 5\" hold  Sitting Heel Slide with Towel - 2-3 x daily - 7 x weekly - 1 sets - 10 reps - 10\" hold  Supine Heel Slide with Strap - 2-3 x daily - 7 x weekly - 3 sets - 10 reps - 10\" hold     Access Code: [email or web address]  URL: [email or web address]  Date: 11/24/2021  Prepared by: Jodee Quinn    Exercises  Supine Bridge with Resistance Band - 2 x daily - 7 x weekly - 3 sets - 10 reps - 5 hold  Clamshell with Resistance - 2 x daily - 7 x weekly - 3 sets - 10 reps - 5 hold    Therapeutic Exercise and NMR EXR  [x] (35398) Provided verbal/tactile cueing for activities related to strengthening, flexibility, endurance, ROM for improvements in LE, proximal hip, and core control with self care, mobility, lifting, ambulation.  [] (95605) Provided verbal/tactile cueing for activities related to improving balance, coordination, kinesthetic sense, posture, motor skill, proprioception  to assist with LE, proximal hip, and core control in self care, mobility, lifting, ambulation and eccentric single leg control.      NMR and Therapeutic Activities:    [x] (44476 or 50271) Provided verbal/tactile cueing for activities related to improving balance, coordination, kinesthetic sense, posture, motor skill, proprioception and motor activation to allow for proper function of core, proximal hip and LE with self care and ADLs  [] (87830) Gait Re-education- Provided training and instruction to the patient for proper LE, core and proximal hip recruitment and positioning and eccentric body weight control with ambulation re-education including up and down stairs     Home Exercise Program:    [x] (39329) Reviewed/Progressed HEP activities related to strengthening, flexibility, endurance, ROM of core, proximal hip and LE for functional self-care, mobility, lifting and ambulation/stair navigation   [] (79287)Reviewed/Progressed HEP activities related to improving balance, coordination, kinesthetic sense, posture, motor skill, proprioception of core, proximal hip and LE for self care, mobility, lifting, and ambulation/stair navigation      Manual Treatments:  PROM / STM / Oscillations-Mobs:  G-I, II, III, IV (PA's, Inf., Post.)  [x] (26465) Provided manual therapy to mobilize LE, proximal hip and/or LS spine soft tissue/joints for the purpose of modulating pain, promoting relaxation,  increasing ROM, reducing/eliminating soft tissue swelling/inflammation/restriction, improving soft tissue extensibility and allowing for proper ROM for normal function with self care, mobility, lifting and ambulation. Modalities:   x15 min to L knee after treatment   [x] GAME READY (VASO)- for significant edema, swelling, pain control. Charges  Timed Code Treatment Minutes: 39'   Total Treatment Minutes: 61'     Medicare total (add KX at $2000)  386.00         [] EVAL (LOW) 04305   [] EVAL (MOD) 35205  [] EVAL (HIGH) 03096   [] RE-EVAL     [x] DP(34499) x 2    [] IONTO  [x] NMR (79575) x 1    [x] VASO  [] Manual (74813) x      [] Other:  [] TA x      [] Mech Traction (32015)  [] ES(attended) (89315)      [] ES (un) (17194):         GOALS:  Patient stated goal: Get rid of pain  []? Progressing: []? Met: []? Not Met: []? Adjusted     Therapist goals for Patient:   Short Term Goals: To be achieved in: 2 weeks   1. Independent in HEP and progression per patient tolerance, in order to prevent re-injury. []? Progressing: []? Met: []? Not Met: []? Adjusted  2. Patient will have a decrease in pain to facilitate improvement in movement, function, and ADLs as indicated by Functional Deficits. []? Progressing: []? Met: []? Not Met: []? Adjusted     Long Term Goals: To be achieved in: 12 weeks  1. Disability index score of 45% or less for the LEFS to assist with reaching prior level of function. []? Progressing: []? Met: []? Not Met: []? Adjusted  2. Patient will demonstrate increased AROM to 0-130 to allow for proper joint functioning as indicated by patients Functional Deficits. []? Progressing: []? Met: []? Not Met: []? Adjusted  3.  Patient will demonstrate an increase in Strength to good proximal hip strength and control, within 5lb HHD in LE to allow for proper functional mobility as indicated by patients Functional Deficits. []? Progressing: []? Met: []? Not Met: []? Adjusted  4. Patient will return to walking with normalized gait pattern with or without appropriate assistive device functional activities without increased symptoms or restriction. []? Progressing: []? Met: []? Not Met: []? Adjusted  5. Reciprocal gait with stairs without pain(patient specific functional goal)    []? Progressing: []? Met: []? Not Met: []? Adjusted         Progression Towards Functional goals:  [] Patient is progressing as expected towards functional goals listed. [] Progression is slowed due to complexities listed. [] Progression has been slowed due to co-morbidities. [x] Plan just implemented, too soon to assess goals progression  [] Other:         Overall Progression Towards Functional goals/ Treatment Progress Update:  [] Patient is progressing as expected towards functional goals listed. [] Progression is slowed due to complexities/Impairments listed. [] Progression has been slowed due to co-morbidities. [x] Plan just implemented, too soon to assess goals progression <30days   [] Goals require adjustment due to lack of progress  [] Patient is not progressing as expected and requires additional follow up with physician  [] Other    Prognosis for POC: [x] Good [] Fair  [] Poor      Patient requires continued skilled intervention: [x] Yes  [] No    Treatment/Activity Tolerance:  [x] Patient able to complete treatment  [] Patient limited by fatigue  [] Patient limited by pain    [] Patient limited by other medical complications  [] Other:     ASSESSMENT: Patient s/p L TKR on 11/16/21. Presents with pain, limited ROM, strength and functional mobility as expected s/p post-op TKR. Increased AROM noted this visit. Pt requires skilled intervention to restore ROM, strength, functional endurance and balance in order to perform ADLs without significant symptoms or limitations.     Return to Play: (if applicable)   []  Stage 1: Intro to Strength   []  Stage 2: Return to Run and Strength   []  Stage 3: Return to Jump and Strength   []  Stage 4: Dynamic Strength and Agility   []  Stage 5: Sport Specific Training     []  Ready to Return to Play, Meets All Above Stages   []  Not Ready for Return to Sports   Comments:                               PLAN: See eval. Cont PT 2-3x/week. Progress ROM/functional mobility as tolerated. [x] Continue per plan of care [] Alter current plan (see comments above)  [] Plan of care initiated [] Hold pending MD visit [] Discharge      Electronically signed by:  Elidia Tipton, [address]    Note: If patient does not return for scheduled/ recommended follow up visits, this note will serve as a discharge from care along with most recent update on progress.

## 2021-11-30 ENCOUNTER — OFFICE VISIT (OUTPATIENT)
Dept: ORTHOPEDIC SURGERY | Age: 71
End: 2021-11-30

## 2021-11-30 VITALS — WEIGHT: 220 LBS | HEIGHT: 72 IN | BODY MASS INDEX: 29.8 KG/M2

## 2021-11-30 DIAGNOSIS — Z47.89 ORTHOPEDIC AFTERCARE: Primary | ICD-10-CM

## 2021-11-30 PROCEDURE — 99024 POSTOP FOLLOW-UP VISIT: CPT | Performed by: ORTHOPAEDIC SURGERY

## 2021-11-30 NOTE — PROGRESS NOTES
POST OPERATIVE ORTHOPAEDIC NOTE    DOS: 11/16/2021  PROCEDURES: Left total knee arthroplasty    The patient has been recovering. The patient states the pain is 4/10 pain. The patient has started PT. he is gone to 5 physical therapy visits. He states it is achy. He denies placing pillows underneath his knee. He is accompanied by his wife. Focused pertinent physical examination of the operative extremity:  Wounds C/D/I, well healing surgical incision without erythema or drainage  6/105/0passively he can get down to 0 degrees full extension. Stable to varus and valgus at 0 and 30 degrees  No swelling in the leg. Nontender to palpation calf. Skin intact throughout  5/5 IP Q H TA G EHL  SILT DP SP LP MP S S  +2 DP pulse    Radiographs: 2 view left knee 11/30/2021: No gross interval change in placement of implants. Negative fracture. Satisfactory alignment. Diagnosis Orders   1. Orthopedic aftercare  XR KNEE LEFT (1-2 VIEWS)     Assessment and plan: The patient is now 14 days status post the above listed procedure and is recovering    . --Greater than 50% of the time (11/20 minutes) was spent coordinating care and discussing postoperative recovery course  -I had a pleasant discussion with the patient and his wife today. Upon further discussion with him he states that he had been Saran wrapping his dressing and showering. I once again reeducated him as I had when I had contacted him for him to take off the dressing and it is okay for the wound to get wet by way of a shower but not to submerge the wound for a total of 1 month. I also educated him on scar tissue massage. He expressed understanding today. -They state that they will be leaving for roughly a week or 2 down to [place].   I explained to him the importance of truly working on full terminal knee extension and given he is slightly short on full terminal knee extension at this time and to help him, a knee extension board will be provided to him.  He will be educated on how to do this. 3x10 = 0.  -We had a good conversation today overall with regard to refocusing efforts with regard to terminal knee extension and his overall recovery. I think he is making good strides and gains with physical therapy however I did educate him as to what to expect with regard to working on terminal knee extension as I had previously. He has had a very good attitude towards regaining function throughout his postop recovery thus far.  -I also educated him on gait with regard to heel strike foot flat toe off. This will hopefully help in an effort to continue to work on full terminal knee extension which he had intraoperatively. -He will continue to work with physical therapy formally. They will work on knee flexion but will also primarily work on knee extension and to continue to work on this. A balance through this is important.  -Aspirin 325 mg p.o. daily for total of 6 weeks for postoperative DVT/PE prophylaxis given his travel upcoming.  -I did express my concern with travel so soon after surgery with regard to DVT/VTE and him missing formal physical therapy visits. He will continue to work on terminal knee extension and knee flexion will bring the knee extension board with him. -OTC Tylenol per bottle parent discomfort. Tramadol as previously prescribed as needed pain. Ultimately I expect him to transition off of tramadol over the next week or 2.  -All questions answered to the patient's satisfaction and the patient expressed understanding and agreement with the above listed treatment plan  -Follow up in 4 weeks for 6-week postop visit with 2V L knee NWB  -Thank you for the clinical consultation and allowing me to participate in the patient's care. Electronically signed by Luis Daniel Cerrato MD on 11/30/21 at 1:08 PM EST         Luis Daniel Cerrato MD       Orthopaedic Surgery-Sports Medicine      Disclaimer:   This note was dictated with voice recognition software. Though review and correction are routinely performed, please contact the office/medical records for any errors requiring correction.

## 2021-12-01 ENCOUNTER — HOSPITAL ENCOUNTER (OUTPATIENT)
Dept: PHYSICAL THERAPY | Age: 71
Setting detail: THERAPIES SERIES
Discharge: HOME | End: 2021-12-01
Payer: MEDICARE

## 2021-12-01 DIAGNOSIS — Z47.89 ORTHOPEDIC AFTERCARE: ICD-10-CM

## 2021-12-01 DIAGNOSIS — M25.562 ACUTE PAIN OF LEFT KNEE: ICD-10-CM

## 2021-12-01 DIAGNOSIS — M17.12 PRIMARY OSTEOARTHRITIS OF LEFT KNEE: Primary | ICD-10-CM

## 2021-12-01 DIAGNOSIS — G89.18 POST-OP PAIN: ICD-10-CM

## 2021-12-01 PROCEDURE — 97112 NEUROMUSCULAR REEDUCATION: CPT | Performed by: PHYSICAL THERAPIST

## 2021-12-01 PROCEDURE — 97110 THERAPEUTIC EXERCISES: CPT | Performed by: PHYSICAL THERAPIST

## 2021-12-01 PROCEDURE — 97016 VASOPNEUMATIC DEVICE THERAPY: CPT | Performed by: PHYSICAL THERAPIST

## 2021-12-01 NOTE — FLOWSHEET NOTE
[facility], [address]  Phone: [phone]  Fax [phone]    Physical Therapy Treatment Note/ Progress Report:           Date:  2021    Patient Name:  Pelon Whipple    :  1950  MRN: [id number]  Restrictions/Precautions:  R TKR x 6 years ago. Medical/Treatment Diagnosis Information:  · Diagnosis: M17.12 (ICD-10-CM) - Primary osteoarthritis of left knee s/p L TKR on 21  · Treatment Diagnosis: M17.12 (ICD-10-CM) - Primary osteoarthritis of left knee s/p L TKR on 21, L knee pain Q16.662  Insurance/Certification information:  PT Insurance Information: [facility]/  Physician Information:  Referring Practitioner: Dr. Dipak Collier  Has the plan of care been signed (Y/N):        []  Yes  [x]  No     Date of Patient follow up with Physician:  21      Is this a Progress Report:     []  Yes  [x]  No        If Yes:  Date Range for reporting period:  Beginning; 21  Ending; 21     Progress report will be due (10 Rx or 30 days whichever is less):        Recertification will be due (POC Duration  / 90 days whichever is less): 12 weeks      Visit # Insurance Allowable Auth Required   In-person 5 [facility]/ []  Yes ? []  No    Telehealth   []  Yes []  No    Total          Functional Scale: LEFS: 0/80 100%   Date assessed: 21      Therapy Diagnosis/Practice Pattern: 4H      Number of Comorbidities:  []0     [x]1-2    []3    Latex Allergy:  [x]NO      []YES  Preferred Language for Healthcare:   [x]English       []other:      Pain level:  4-5/10     SUBJECTIVE:  Reports no new issues. Taking pain meds as needed. Saw MD yesterday. Issued plank board to use at home 3x/day for 10 min for knee ext. Told to wear the stockings especially when on the airplane. Leaves on Dec 9th for 2 weeks. OBJECTIVE:    Observation:    Test measurements 109-110  knee flexion this visit on bike.  [address] [facility] 98 with O.P with heel slide. )  Knee extension 0 with O.P.    RESTRICTIONS/PRECAUTIONS: R TKR x6-7 years ago     Exercises/Interventions: Progressed HEP    Therapeutic Ex (28396) Sets/sec Reps Notes/CUES   QS, GS AP  10 reps HEP   Heelslides in sitting and supine with strap H10 10 reps  deg HEP         SLR with min assist HEP   Hip abduction HEP   Sitting HS stretches H30 5 HEP   Sitting GS  Stretches with strap H30 5 HEP   Prone knee ext hang off table  5'    EZ chair for flexion 5'  99 on remote and 111 with goniometer. 10 on remote with extension at  Second notch   Bike for ROM 6'  110. Full revolution noted at end of session   Incline stretches H30 5x    Bridges with GTB H5 2x10 reps  HEP   SL clams with GTB H5 2x10 reps  HEP   Side stepping with LVL  2 laps    Standing HR  2x10 reps    Manual Intervention (22681)      Knee ext O.P  3 reps    Patellar mobs 5'                             NMR re-education (91720)   CUES NEEDED         Patient education/HEP/ gait and stair training 5'                 TKE with pilates reformer in standing with yellow spring  2x10 reps                            Therapeutic Activity (94378)            FSU 4\"            FSU up and over 4\"     SL stance H10            HEP instruction: (see scanned forms)  Access Code: [email or web address]  URL: [email or web address]  Date: 11/19/2021  Prepared by: Alcira Mullins     Exercises  Long Sitting Ankle Pumps - 5-7 x daily - 7 x weekly - 3 sets - 10 reps - 1-2 hold  Long Sitting Quad Set - 5-7 x daily - 7 x weekly - 1 sets - 10 reps - 10 hold  Long Sitting Calf Stretch with Strap - 2-3 x daily - 7 x weekly - 1 sets - 5 reps - 30\" hold  Seated Table Hamstring Stretch - 2-3 x daily - 7 x weekly - 1 sets - 5 reps - 30\" hold  Supine Straight Leg Raises - 2-3 x daily - 7 x weekly - 2 sets - 10 reps - 5 hold  Side Leg Lifts - 2-3 x daily - 7 x weekly - 2 sets - 10 reps - 5\" hold  Sitting Heel Slide with Towel - 2-3 x daily - 7 x weekly - 1 sets - 10 reps - 10\" hold  Supine Heel Slide with Strap - 2-3 x daily - 7 x weekly - 3 sets - 10 reps - 10\" hold     Access Code: [email or web address]  URL: [email or web address]  Date: 11/24/2021  Prepared by: Susan Bent    Exercises  Supine Bridge with Resistance Band - 2 x daily - 7 x weekly - 3 sets - 10 reps - 5 hold  Clamshell with Resistance - 2 x daily - 7 x weekly - 3 sets - 10 reps - 5 hold    Therapeutic Exercise and NMR EXR  [x] (84034) Provided verbal/tactile cueing for activities related to strengthening, flexibility, endurance, ROM for improvements in LE, proximal hip, and core control with self care, mobility, lifting, ambulation.  [] (09164) Provided verbal/tactile cueing for activities related to improving balance, coordination, kinesthetic sense, posture, motor skill, proprioception  to assist with LE, proximal hip, and core control in self care, mobility, lifting, ambulation and eccentric single leg control.      NMR and Therapeutic Activities:    [x] (14232 or 59848) Provided verbal/tactile cueing for activities related to improving balance, coordination, kinesthetic sense, posture, motor skill, proprioception and motor activation to allow for proper function of core, proximal hip and LE with self care and ADLs  [] (94818) Gait Re-education- Provided training and instruction to the patient for proper LE, core and proximal hip recruitment and positioning and eccentric body weight control with ambulation re-education including up and down stairs     Home Exercise Program:    [x] (89176) Reviewed/Progressed HEP activities related to strengthening, flexibility, endurance, ROM of core, proximal hip and LE for functional self-care, mobility, lifting and ambulation/stair navigation   [] (47716)Reviewed/Progressed HEP activities related to improving balance, coordination, kinesthetic sense, posture, motor skill, proprioception of core, proximal hip and LE for self care, mobility, lifting, and ambulation/stair navigation Manual Treatments:  PROM / STM / Oscillations-Mobs:  G-I, II, III, IV (PA's, Inf., Post.)  [x] (48615) Provided manual therapy to mobilize LE, proximal hip and/or LS spine soft tissue/joints for the purpose of modulating pain, promoting relaxation,  increasing ROM, reducing/eliminating soft tissue swelling/inflammation/restriction, improving soft tissue extensibility and allowing for proper ROM for normal function with self care, mobility, lifting and ambulation. Modalities:   x15 min to L knee after treatment   [x] GAME READY (VASO)- for significant edema, swelling, pain control. Charges  Timed Code Treatment Minutes: 39'   Total Treatment Minutes: 61'     Medicare total (add KX at $2000)  488.00         [] EVAL (LOW) 44471   [] EVAL (MOD) 77713  [] EVAL (HIGH) 36045   [] RE-EVAL     [x] WJ(05433) x 2    [] IONTO  [x] NMR (99844) x 1    [x] VASO  [] Manual (50762) x      [] Other:  [] TA x      [] Mech Traction (03700)  [] ES(attended) (06024)      [] ES (un) (67892):         GOALS:  Patient stated goal: Get rid of pain  []? Progressing: []? Met: []? Not Met: []? Adjusted     Therapist goals for Patient:   Short Term Goals: To be achieved in: 2 weeks   1. Independent in HEP and progression per patient tolerance, in order to prevent re-injury. []? Progressing: []? Met: []? Not Met: []? Adjusted  2. Patient will have a decrease in pain to facilitate improvement in movement, function, and ADLs as indicated by Functional Deficits. []? Progressing: []? Met: []? Not Met: []? Adjusted     Long Term Goals: To be achieved in: 12 weeks  1. Disability index score of 45% or less for the LEFS to assist with reaching prior level of function. []? Progressing: []? Met: []? Not Met: []? Adjusted  2. Patient will demonstrate increased AROM to 0-130 to allow for proper joint functioning as indicated by patients Functional Deficits. []? Progressing: []? Met: []? Not Met: []? Adjusted  3.  Patient will demonstrate an increase in Strength to good proximal hip strength and control, within 5lb HHD in LE to allow for proper functional mobility as indicated by patients Functional Deficits. []? Progressing: []? Met: []? Not Met: []? Adjusted  4. Patient will return to walking with normalized gait pattern with or without appropriate assistive device functional activities without increased symptoms or restriction. []? Progressing: []? Met: []? Not Met: []? Adjusted  5. Reciprocal gait with stairs without pain(patient specific functional goal)    []? Progressing: []? Met: []? Not Met: []? Adjusted         Progression Towards Functional goals:  [x] Patient is progressing as expected towards functional goals listed. [] Progression is slowed due to complexities listed. [] Progression has been slowed due to co-morbidities. [] Plan just implemented, too soon to assess goals progression  [] Other:         Overall Progression Towards Functional goals/ Treatment Progress Update:  [x] Patient is progressing as expected towards functional goals listed. [] Progression is slowed due to complexities/Impairments listed. [] Progression has been slowed due to co-morbidities. [] Plan just implemented, too soon to assess goals progression <30days   [] Goals require adjustment due to lack of progress  [] Patient is not progressing as expected and requires additional follow up with physician  [] Other    Prognosis for POC: [x] Good [] Fair  [] Poor      Patient requires continued skilled intervention: [x] Yes  [] No    Treatment/Activity Tolerance:  [x] Patient able to complete treatment  [] Patient limited by fatigue  [] Patient limited by pain    [] Patient limited by other medical complications  [] Other:     ASSESSMENT: Patient s/p L TKR on 11/16/21. Presents with pain, limited ROM, strength and functional mobility as expected s/p post-op TKR. Increased AROM noted this visit. Pt requires skilled intervention to restore ROM, strength,

## 2021-12-03 ENCOUNTER — HOSPITAL ENCOUNTER (OUTPATIENT)
Dept: PHYSICAL THERAPY | Age: 71
Setting detail: THERAPIES SERIES
Discharge: HOME | End: 2021-12-03
Payer: MEDICARE

## 2021-12-03 PROCEDURE — 97110 THERAPEUTIC EXERCISES: CPT | Performed by: PHYSICAL THERAPIST

## 2021-12-03 PROCEDURE — 97112 NEUROMUSCULAR REEDUCATION: CPT | Performed by: PHYSICAL THERAPIST

## 2021-12-03 PROCEDURE — 97016 VASOPNEUMATIC DEVICE THERAPY: CPT | Performed by: PHYSICAL THERAPIST

## 2021-12-03 NOTE — FLOWSHEET NOTE
[facility],  [address]  Phone: [phone]  Fax [phone]    Physical Therapy Treatment Note/ Progress Report:           Date:  12/3/2021    Patient Name:  Blaire Mcconnell    :  1950  MRN: [id number]  Restrictions/Precautions:  R TKR x 6 years ago. Medical/Treatment Diagnosis Information:  · Diagnosis: M17.12 (ICD-10-CM) - Primary osteoarthritis of left knee s/p L TKR on 21  · Treatment Diagnosis: M17.12 (ICD-10-CM) - Primary osteoarthritis of left knee s/p L TKR on 21, L knee pain Q79.190  Insurance/Certification information:  PT Insurance Information: /  Physician Information:  Referring Practitioner: Dr. Stephan Velarde  Has the plan of care been signed (Y/N):        []  Yes  [x]  No     Date of Patient follow up with Physician:  21      Is this a Progress Report:     []  Yes  [x]  No        If Yes:  Date Range for reporting period:  Beginning; 21  Ending; 21     Progress report will be due (10 Rx or 30 days whichever is less):        Recertification will be due (POC Duration  / 90 days whichever is less): 12 weeks      Visit # Insurance Allowable Auth Required   In-person 6 / []  Yes ? []  No    Telehealth   []  Yes []  No    Total          Functional Scale: LEFS: 0/80 100%   Date assessed: 21      Therapy Diagnosis/Practice Pattern: 4H      Number of Comorbidities:  []0     [x]1-2    []3    Latex Allergy:  [x]NO      []YES  Preferred Language for Healthcare:   [x]English       []other:      Pain level:  4-5/10     SUBJECTIVE:  Reports no new issues. Taking pain meds as needed. Saw MD yesterday. Issued plank board to use at home 3x/day for 10 min for knee ext. Having trouble tolerating plank board for 10 min. Able to do 5' intervals x2 instead. OBJECTIVE:    Observation:    Test measurements 109-110  knee flexion this visit on bike.  110 with O.P with heel slide. )  Knee extension 0 with O.P.    RESTRICTIONS/PRECAUTIONS: R TKR x6-7 years ago     Exercises/Interventions: Progressed HEP    Therapeutic Ex (13200) Sets/sec Reps Notes/CUES   QS, GS AP  10 reps HEP   Heelslides in sitting and supine with strap H10 10 reps  deg HEP         SLR with min assist HEP   Hip abduction HEP   Sitting HS stretches H30 5 HEP   Sitting GS  Stretches with strap H30 5 HEP   Prone knee ext hang off table      EZ chair for flexion 5'  103 on remote and 114 with goniometer. 5 on remote with extension at  Second notch   Bike for ROM #7 seat 6'  110. Full revolution noted at end of session on #8   Zero knee prop with QS and GS stretches with strap h30 5x    Incline stretches H30 5x    Bridges with GTB H5 2x10 reps  HEP   SL clams with GTB H5 2x10 reps  HEP   Side stepping with LVL     Standing HR  2x10 reps    Manual Intervention (13802)      Knee ext O.P  3 reps    Patellar mobs 5'                             NMR re-education (55761)   CUES NEEDED         Patient education/HEP/ gait and stair training 5'                 TKE with pilates reformer in standing with yellow spring  2x10 reps          MH TKE 45# H5 2x10 reps    CC retro walking   x6 laps Toe-heel         Therapeutic Activity (98424)            FSU 4\"            FSU up and over 4\"/6\"  1x10 reps    SL stance H10            HEP instruction: (see scanned forms)  Access Code: [email or web address]  URL: [email or web address]  Date: 11/19/2021  Prepared by: Ramírez Robison     Exercises  Long Sitting Ankle Pumps - 5-7 x daily - 7 x weekly - 3 sets - 10 reps - 1-2 hold  Long Sitting Quad Set - 5-7 x daily - 7 x weekly - 1 sets - 10 reps - 10 hold  Long Sitting Calf Stretch with Strap - 2-3 x daily - 7 x weekly - 1 sets - 5 reps - 30\" hold  Seated Table Hamstring Stretch - 2-3 x daily - 7 x weekly - 1 sets - 5 reps - 30\" hold  Supine Straight Leg Raises - 2-3 x daily - 7 x weekly - 2 sets - 10 reps - 5 hold  Side Leg Lifts - 2-3 x daily - 7 x weekly - 2 sets - 10 reps - 5\" hold  Sitting Heel Slide with Towel - 2-3 x daily - 7 x weekly - 1 sets - 10 reps - 10\" hold  Supine Heel Slide with Strap - 2-3 x daily - 7 x weekly - 3 sets - 10 reps - 10\" hold     Access Code: [email or web address]  URL: [employer]Too/  Date: 11/24/2021  Prepared by: Avril Olea    Exercises  Supine Bridge with Resistance Band - 2 x daily - 7 x weekly - 3 sets - 10 reps - 5 hold  Clamshell with Resistance - 2 x daily - 7 x weekly - 3 sets - 10 reps - 5 hold    Therapeutic Exercise and NMR EXR  [x] (54730) Provided verbal/tactile cueing for activities related to strengthening, flexibility, endurance, ROM for improvements in LE, proximal hip, and core control with self care, mobility, lifting, ambulation.  [] (23341) Provided verbal/tactile cueing for activities related to improving balance, coordination, kinesthetic sense, posture, motor skill, proprioception  to assist with LE, proximal hip, and core control in self care, mobility, lifting, ambulation and eccentric single leg control.      NMR and Therapeutic Activities:    [x] (08761 or 34371) Provided verbal/tactile cueing for activities related to improving balance, coordination, kinesthetic sense, posture, motor skill, proprioception and motor activation to allow for proper function of core, proximal hip and LE with self care and ADLs  [] (45716) Gait Re-education- Provided training and instruction to the patient for proper LE, core and proximal hip recruitment and positioning and eccentric body weight control with ambulation re-education including up and down stairs     Home Exercise Program:    [x] (47865) Reviewed/Progressed HEP activities related to strengthening, flexibility, endurance, ROM of core, proximal hip and LE for functional self-care, mobility, lifting and ambulation/stair navigation   [] (37517)Reviewed/Progressed HEP activities related to improving balance, coordination, kinesthetic sense, posture, indicated by patients Functional Deficits. []? Progressing: []? Met: []? Not Met: []? Adjusted  3. Patient will demonstrate an increase in Strength to good proximal hip strength and control, within 5lb HHD in LE to allow for proper functional mobility as indicated by patients Functional Deficits. []? Progressing: []? Met: []? Not Met: []? Adjusted  4. Patient will return to walking with normalized gait pattern with or without appropriate assistive device functional activities without increased symptoms or restriction. []? Progressing: []? Met: []? Not Met: []? Adjusted  5. Reciprocal gait with stairs without pain(patient specific functional goal)    []? Progressing: []? Met: []? Not Met: []? Adjusted         Progression Towards Functional goals:  [x] Patient is progressing as expected towards functional goals listed. [] Progression is slowed due to complexities listed. [] Progression has been slowed due to co-morbidities. [] Plan just implemented, too soon to assess goals progression  [] Other:         Overall Progression Towards Functional goals/ Treatment Progress Update:  [x] Patient is progressing as expected towards functional goals listed. [] Progression is slowed due to complexities/Impairments listed. [] Progression has been slowed due to co-morbidities. [] Plan just implemented, too soon to assess goals progression <30days   [] Goals require adjustment due to lack of progress  [] Patient is not progressing as expected and requires additional follow up with physician  [] Other    Prognosis for POC: [x] Good [] Fair  [] Poor      Patient requires continued skilled intervention: [x] Yes  [] No    Treatment/Activity Tolerance:  [x] Patient able to complete treatment  [] Patient limited by fatigue  [] Patient limited by pain    [] Patient limited by other medical complications  [] Other:     ASSESSMENT: Patient s/p L TKR on 11/16/21.  Presents with pain, limited ROM, strength and functional mobility as expected s/p post-op TKR. Increased AROM noted this visit. Pt requires skilled intervention to restore ROM, strength, functional endurance and balance in order to perform ADLs without significant symptoms or limitations. Return to Play: (if applicable)   []  Stage 1: Intro to Strength   []  Stage 2: Return to Run and Strength   []  Stage 3: Return to Jump and Strength   []  Stage 4: Dynamic Strength and Agility   []  Stage 5: Sport Specific Training     []  Ready to Return to Play, Meets All Above Stages   []  Not Ready for Return to Sports   Comments:                               PLAN: See eval. Cont PT 2-3x/week. Progress ROM/functional mobility as tolerated. Emphasis on CKC extension strengthening N.V.  [x] Continue per plan of care [] Alter current plan (see comments above)  [] Plan of care initiated [] Hold pending MD visit [] Discharge      Electronically signed by:  Néstor Foss, [address]    Note: If patient does not return for scheduled/ recommended follow up visits, this note will serve as a discharge from care along with most recent update on progress.

## 2021-12-06 ENCOUNTER — HOSPITAL ENCOUNTER (OUTPATIENT)
Dept: PHYSICAL THERAPY | Age: 71
Setting detail: THERAPIES SERIES
Discharge: HOME | End: 2021-12-06
Payer: MEDICARE

## 2021-12-06 PROCEDURE — 97112 NEUROMUSCULAR REEDUCATION: CPT | Performed by: PHYSICAL THERAPIST

## 2021-12-06 PROCEDURE — 97110 THERAPEUTIC EXERCISES: CPT | Performed by: PHYSICAL THERAPIST

## 2021-12-06 PROCEDURE — 97016 VASOPNEUMATIC DEVICE THERAPY: CPT | Performed by: PHYSICAL THERAPIST

## 2021-12-06 NOTE — FLOWSHEET NOTE
[facility], [address]  Phone: [phone]  Fax [phone]    Physical Therapy Treatment Note/ Progress Report:           Date:  2021    Patient Name:  Sendy Rausch    :  1950  MRN: [id number]  Restrictions/Precautions:  R TKR x 6 years ago. Medical/Treatment Diagnosis Information:  · Diagnosis: M17.12 (ICD-10-CM) - Primary osteoarthritis of left knee s/p L TKR on 21  · Treatment Diagnosis: M17.12 (ICD-10-CM) - Primary osteoarthritis of left knee s/p L TKR on 21, L knee pain U93.710  Insurance/Certification information:  PT Insurance Information: MC/  Physician Information:  Referring Practitioner: Dr. Elva Ash  Has the plan of care been signed (Y/N):        []  Yes  [x]  No     Date of Patient follow up with Physician:  21      Is this a Progress Report:     []  Yes  [x]  No        If Yes:  Date Range for reporting period:  Beginning; 21  Ending; 21     Progress report will be due (10 Rx or 30 days whichever is less): 43       Recertification will be due (POC Duration  / 90 days whichever is less): 12 weeks      Visit # Insurance Allowable Auth Required   In-person 7 [facility]/ []  Yes ? []  No    Telehealth   []  Yes []  No    Total          Functional Scale: LEFS: 0/80 100%   Date assessed: 21      Therapy Diagnosis/Practice Pattern: 4H      Number of Comorbidities:  []0     [x]1-2    []3    Latex Allergy:  [x]NO      []YES  Preferred Language for Healthcare:   [x]English       []other:      Pain level:  4/10     SUBJECTIVE:  Reports no new issues. Still doing 5 min x2 with plank board due to it being too painful. OBJECTIVE:    Observation:    Test measurements 110  knee flexion this visit on bike.  110 with O.P with heel slide. )  Knee extension 0 with O.P.    RESTRICTIONS/PRECAUTIONS: R TKR x6-7 years ago     Exercises/Interventions: Progressed HEP    Therapeutic Ex (67345) Sets/sec Reps Notes/CUES   QS, GS AP  10 reps HEP   Heelslides in sitting and supine with strap H10 10 reps  deg HEP   Hip flexor stretches off EOB with strap H30 5 reps + to HEP   SLR with min assist 2x15 reps HEP   Hip abduction H5 2x10 reps HEP   Sitting HS stretches H30 5 HEP   Sitting GS  Stretches with strap H30 5 HEP   Prone knee ext hang off table  5'    Prone TKE with bolster  H5 2x15 reps          EZ chair for flexion 5'  106 on remote and 115 with goniometer. 3-4 on remote with extension at  Second notch   Bike for ROM #8 seat 6'  110. Full revolution noted at end of session on #8   Zero knee prop with QS and GS stretches with strap h30 5x    Incline stretches H30 5x    Bridges with GTB  HEP   SL clams with GTB  HEP   Side stepping with LVL  2 laps + to HEP   Standing HR  Manual Intervention (09680)      Knee ext O.P  3 reps    Patellar mobs 5'                             NMR re-education (36121)   CUES NEEDED         Patient education/HEP/ gait and stair training 5'                 TKE with pilates reformer in standing with yellow spring        MH TKE 45# H5 CC retro walking         Therapeutic Activity (52860)            FSU 4\"            FSU up and over 4\"/6\" LSU up and over  1x10 reps    SL stance H10            HEP instruction: (see scanned forms)  Access Code: [email or web address]  URL: [email or web address]  Date: 11/19/2021  Prepared by: Alcira Mullins     Exercises  Long Sitting Ankle Pumps - 5-7 x daily - 7 x weekly - 3 sets - 10 reps - 1-2 hold  Long Sitting Quad Set - 5-7 x daily - 7 x weekly - 1 sets - 10 reps - 10 hold  Long Sitting Calf Stretch with Strap - 2-3 x daily - 7 x weekly - 1 sets - 5 reps - 30\" hold  Seated Table Hamstring Stretch - 2-3 x daily - 7 x weekly - 1 sets - 5 reps - 30\" hold  Supine Straight Leg Raises - 2-3 x daily - 7 x weekly - 2 sets - 10 reps - 5 hold  Side Leg Lifts - 2-3 x daily - 7 x weekly - 2 sets - 10 reps - 5\" hold  Sitting Heel Slide with Towel - 2-3 x daily - 7 x weekly - 1 sets - 10 reps - 10\" hold  Supine Heel Slide with Strap - 2-3 x daily - 7 x weekly - 3 sets - 10 reps - 10\" hold     Access Code: [email or web address]  URL: [email or web address]  Date: 11/24/2021  Prepared by: Nickie Morton    Exercises  Supine Bridge with Resistance Band - 2 x daily - 7 x weekly - 3 sets - 10 reps - 5 hold  Clamshell with Resistance - 2 x daily - 7 x weekly - 3 sets - 10 reps - 5 hold    Therapeutic Exercise and NMR EXR  [x] (57652) Provided verbal/tactile cueing for activities related to strengthening, flexibility, endurance, ROM for improvements in LE, proximal hip, and core control with self care, mobility, lifting, ambulation.  [] (18006) Provided verbal/tactile cueing for activities related to improving balance, coordination, kinesthetic sense, posture, motor skill, proprioception  to assist with LE, proximal hip, and core control in self care, mobility, lifting, ambulation and eccentric single leg control.      NMR and Therapeutic Activities:    [x] (57782 or 45634) Provided verbal/tactile cueing for activities related to improving balance, coordination, kinesthetic sense, posture, motor skill, proprioception and motor activation to allow for proper function of core, proximal hip and LE with self care and ADLs  [] (10677) Gait Re-education- Provided training and instruction to the patient for proper LE, core and proximal hip recruitment and positioning and eccentric body weight control with ambulation re-education including up and down stairs     Home Exercise Program:    [x] (70039) Reviewed/Progressed HEP activities related to strengthening, flexibility, endurance, ROM of core, proximal hip and LE for functional self-care, mobility, lifting and ambulation/stair navigation   [] (24460)Reviewed/Progressed HEP activities related to improving balance, coordination, kinesthetic sense, posture, motor skill, proprioception of core, proximal hip and LE for self care, mobility, lifting, and ambulation/stair navigation      Manual Treatments:  PROM / STM / Oscillations-Mobs:  G-I, II, III, IV (PA's, Inf., Post.)  [x] (91066) Provided manual therapy to mobilize LE, proximal hip and/or LS spine soft tissue/joints for the purpose of modulating pain, promoting relaxation,  increasing ROM, reducing/eliminating soft tissue swelling/inflammation/restriction, improving soft tissue extensibility and allowing for proper ROM for normal function with self care, mobility, lifting and ambulation. Modalities:   x15 min to L knee after treatment   [x] GAME READY (VASO)- for significant edema, swelling, pain control. Charges  Timed Code Treatment Minutes: 48'   Total Treatment Minutes: 72'     Medicare total (add KX at $2000)  700.00         [] EVAL (LOW) 02381   [] EVAL (MOD) 07300  [] EVAL (HIGH) 53771   [] RE-EVAL     [x] LP(44074) x 2    [] IONTO  [x] NMR (17696) x 1    [x] VASO  [] Manual (40752) x      [] Other:  [] TA x      [] Mech Traction (18247)  [] ES(attended) (10954)      [] ES (un) (98076):         GOALS:  Patient stated goal: Get rid of pain  []? Progressing: []? Met: []? Not Met: []? Adjusted     Therapist goals for Patient:   Short Term Goals: To be achieved in: 2 weeks   1. Independent in HEP and progression per patient tolerance, in order to prevent re-injury. []? Progressing: []? Met: []? Not Met: []? Adjusted  2. Patient will have a decrease in pain to facilitate improvement in movement, function, and ADLs as indicated by Functional Deficits. []? Progressing: []? Met: []? Not Met: []? Adjusted     Long Term Goals: To be achieved in: 12 weeks  1. Disability index score of 45% or less for the LEFS to assist with reaching prior level of function. []? Progressing: []? Met: []? Not Met: []? Adjusted  2. Patient will demonstrate increased AROM to 0-130 to allow for proper joint functioning as indicated by patients Functional Deficits. []?  Progressing: []? Met: []? Not Met: []? Adjusted  3. Patient will demonstrate an increase in Strength to good proximal hip strength and control, within 5lb HHD in LE to allow for proper functional mobility as indicated by patients Functional Deficits. []? Progressing: []? Met: []? Not Met: []? Adjusted  4. Patient will return to walking with normalized gait pattern with or without appropriate assistive device functional activities without increased symptoms or restriction. []? Progressing: []? Met: []? Not Met: []? Adjusted  5. Reciprocal gait with stairs without pain(patient specific functional goal)    []? Progressing: []? Met: []? Not Met: []? Adjusted         Progression Towards Functional goals:  [x] Patient is progressing as expected towards functional goals listed. [] Progression is slowed due to complexities listed. [] Progression has been slowed due to co-morbidities. [] Plan just implemented, too soon to assess goals progression  [] Other:         Overall Progression Towards Functional goals/ Treatment Progress Update:  [x] Patient is progressing as expected towards functional goals listed. [] Progression is slowed due to complexities/Impairments listed. [] Progression has been slowed due to co-morbidities. [] Plan just implemented, too soon to assess goals progression <30days   [] Goals require adjustment due to lack of progress  [] Patient is not progressing as expected and requires additional follow up with physician  [] Other    Prognosis for POC: [x] Good [] Fair  [] Poor      Patient requires continued skilled intervention: [x] Yes  [] No    Treatment/Activity Tolerance:  [x] Patient able to complete treatment  [] Patient limited by fatigue  [] Patient limited by pain    [] Patient limited by other medical complications  [] Other:     ASSESSMENT: Patient s/p L TKR on 11/16/21. Presents with pain, limited ROM, strength and functional mobility as expected s/p post-op TKR. Increased AROM noted this visit. Pt requires skilled intervention to restore ROM, strength, functional endurance and balance in order to perform ADLs without significant symptoms or limitations. Return to Play: (if applicable)   []  Stage 1: Intro to Strength   []  Stage 2: Return to Run and Strength   []  Stage 3: Return to Jump and Strength   []  Stage 4: Dynamic Strength and Agility   []  Stage 5: Sport Specific Training     []  Ready to Return to Play, Meets All Above Stages   []  Not Ready for Return to Sports   Comments:                               PLAN: See eval. Cont PT 2-3x/week. Progress ROM/functional mobility as tolerated. Emphasis on CKC extension strengthening N.V.  [x] Continue per plan of care [] Alter current plan (see comments above)  [] Plan of care initiated [] Hold pending MD visit [] Discharge      Electronically signed by:  Melva Holcomb, [address] [place] [address]    Note: If patient does not return for scheduled/ recommended follow up visits, this note will serve as a discharge from care along with most recent update on progress.

## 2021-12-08 ENCOUNTER — HOSPITAL ENCOUNTER (OUTPATIENT)
Dept: PHYSICAL THERAPY | Age: 71
Setting detail: THERAPIES SERIES
Discharge: HOME | End: 2021-12-08
Payer: MEDICARE

## 2021-12-08 PROCEDURE — 97016 VASOPNEUMATIC DEVICE THERAPY: CPT | Performed by: PHYSICAL THERAPIST

## 2021-12-08 PROCEDURE — 97112 NEUROMUSCULAR REEDUCATION: CPT | Performed by: PHYSICAL THERAPIST

## 2021-12-08 PROCEDURE — 97110 THERAPEUTIC EXERCISES: CPT | Performed by: PHYSICAL THERAPIST

## 2021-12-08 PROCEDURE — 97140 MANUAL THERAPY 1/> REGIONS: CPT | Performed by: PHYSICAL THERAPIST

## 2021-12-08 NOTE — FLOWSHEET NOTE
[facility],  [address]  Phone: [phone]  Fax [phone]    Physical Therapy Treatment Note/ Progress Report:           Date:  2021    Patient Name:  Pelon Whipple    :  1950  MRN: [id number]  Restrictions/Precautions:  R TKR x 6 years ago. Medical/Treatment Diagnosis Information:  · Diagnosis: M17.12 (ICD-10-CM) - Primary osteoarthritis of left knee s/p L TKR on 21  · Treatment Diagnosis: M17.12 (ICD-10-CM) - Primary osteoarthritis of left knee s/p L TKR on 21, L knee pain O27.164  Insurance/Certification information:  PT Insurance Information: /  Physician Information:  Referring Practitioner: Dr. Dipak Collier  Has the plan of care been signed (Y/N):        []  Yes  [x]  No     Date of Patient follow up with Physician:  21      Is this a Progress Report:     []  Yes  [x]  No        If Yes:  Date Range for reporting period:  Beginning; 21  Ending; 21     Progress report will be due (10 Rx or 30 days whichever is less):        Recertification will be due (POC Duration  / 90 days whichever is less): 12 weeks      Visit # Insurance Allowable Auth Required   In-person 8 MC/ []  Yes ? []  No    Telehealth   []  Yes []  No    Total          Functional Scale: LEFS: 0/80 100%   Date assessed: 21      Therapy Diagnosis/Practice Pattern: 4H      Number of Comorbidities:  []0     [x]1-2    []3    Latex Allergy:  [x]NO      []YES  Preferred Language for Healthcare:   [x]English       []other:      Pain level:  3-4/10     SUBJECTIVE:  Reports no new issues. Leaving for [place] tomorrow am for 10 days. OBJECTIVE: 21 4 weeks post-op.  Orthovitals due N.V.   Observation:    Test measurements: Knee extension 0 with O.P.    RESTRICTIONS/PRECAUTIONS: R TKR x6-7 years ago     Exercises/Interventions: Progressed HEP    Therapeutic Ex (25356) Sets/sec Reps Notes/CUES   QS, GS AP  10 reps HEP   Heelslides in sitting and supine with strap H10 10 reps  deg HEP   Hip flexor stretches off EOB with strap + to HEP   SLR with min assist HEP   Hip abduction HEP   Sitting HS stretches HEP   Sitting GS  Stretches with strap H30 5 HEP   Prone knee ext hang off table  5'    Prone TKE with bolster  H5 2x15 reps          EZ chair for flexion 5'  107 on remote and 117-118 with goniometer. 2-3 on remote with extension at  Second notch   Bike for ROM #8 seat 6'  . Full revolution noted at end of session on #8 and #7   Zero knee prop with QS and GS stretches with strap h30 5x    Incline stretches H30 5x    Bridges with GTB  HEP   SL clams with GTB  HEP   Side stepping with LVL  2 laps + to HEP   Standing HR  Manual Intervention (54133)      Knee ext O.P  3 reps    Patellar mobs 5'                             NMR re-education (41104)   CUES NEEDED         Patient education/HEP/ gait and stair training 5'                 TKE with pilates reformer in standing with yellow spring  LP 80# H5 2x10 reps    MH TKE 45# H5 2x10 reps    CC retro walking 20#/side stepping 15#  x6 laps Toe-heel   True stretch HS 3D H15 3 reps    Therapeutic Activity (68120)            FSU 4\"            FSU up and over 6\"/8\"  1x10 reps each   SL stance H10            HEP instruction: (see scanned forms)  Access Code: [email or web address]  URL: [email or web address]  Date: 11/19/2021  Prepared by: Christ Cogan     Exercises  Long Sitting Ankle Pumps - 5-7 x daily - 7 x weekly - 3 sets - 10 reps - 1-2 hold  Long Sitting Quad Set - 5-7 x daily - 7 x weekly - 1 sets - 10 reps - 10 hold  Long Sitting Calf Stretch with Strap - 2-3 x daily - 7 x weekly - 1 sets - 5 reps - 30\" hold  Seated Table Hamstring Stretch - 2-3 x daily - 7 x weekly - 1 sets - 5 reps - 30\" hold  Supine Straight Leg Raises - 2-3 x daily - 7 x weekly - 2 sets - 10 reps - 5 hold  Side Leg Lifts - 2-3 x daily - 7 x weekly - 2 sets - 10 reps - 5\" hold  Sitting Heel Slide with Towel - 2-3 x daily - 7 x weekly - 1 sets - 10 reps - 10\" hold  Supine Heel Slide with Strap - 2-3 x daily - 7 x weekly - 3 sets - 10 reps - 10\" hold     Access Code: [email or web address]  URL: [email or web address]  Date: 11/24/2021  Prepared by: Fozia Civil    Exercises  Supine Bridge with Resistance Band - 2 x daily - 7 x weekly - 3 sets - 10 reps - 5 hold  Clamshell with Resistance - 2 x daily - 7 x weekly - 3 sets - 10 reps - 5 hold    Therapeutic Exercise and NMR EXR  [x] (92151) Provided verbal/tactile cueing for activities related to strengthening, flexibility, endurance, ROM for improvements in LE, proximal hip, and core control with self care, mobility, lifting, ambulation.  [] (74618) Provided verbal/tactile cueing for activities related to improving balance, coordination, kinesthetic sense, posture, motor skill, proprioception  to assist with LE, proximal hip, and core control in self care, mobility, lifting, ambulation and eccentric single leg control.      NMR and Therapeutic Activities:    [x] (63034 or 27523) Provided verbal/tactile cueing for activities related to improving balance, coordination, kinesthetic sense, posture, motor skill, proprioception and motor activation to allow for proper function of core, proximal hip and LE with self care and ADLs  [] (93531) Gait Re-education- Provided training and instruction to the patient for proper LE, core and proximal hip recruitment and positioning and eccentric body weight control with ambulation re-education including up and down stairs     Home Exercise Program:    [x] (41491) Reviewed/Progressed HEP activities related to strengthening, flexibility, endurance, ROM of core, proximal hip and LE for functional self-care, mobility, lifting and ambulation/stair navigation   [] (08559)Reviewed/Progressed HEP activities related to improving balance, coordination, kinesthetic sense, posture, motor skill, proprioception of core, []? Progressing: []? Met: []? Not Met: []? Adjusted  3. Patient will demonstrate an increase in Strength to good proximal hip strength and control, within 5lb HHD in LE to allow for proper functional mobility as indicated by patients Functional Deficits. []? Progressing: []? Met: []? Not Met: []? Adjusted  4. Patient will return to walking with normalized gait pattern with or without appropriate assistive device functional activities without increased symptoms or restriction. []? Progressing: []? Met: []? Not Met: []? Adjusted  5. Reciprocal gait with stairs without pain(patient specific functional goal)    []? Progressing: []? Met: []? Not Met: []? Adjusted         Progression Towards Functional goals:  [x] Patient is progressing as expected towards functional goals listed. [] Progression is slowed due to complexities listed. [] Progression has been slowed due to co-morbidities. [] Plan just implemented, too soon to assess goals progression  [] Other:         Overall Progression Towards Functional goals/ Treatment Progress Update:  [x] Patient is progressing as expected towards functional goals listed. [] Progression is slowed due to complexities/Impairments listed. [] Progression has been slowed due to co-morbidities. [] Plan just implemented, too soon to assess goals progression <30days   [] Goals require adjustment due to lack of progress  [] Patient is not progressing as expected and requires additional follow up with physician  [] Other    Prognosis for POC: [x] Good [] Fair  [] Poor      Patient requires continued skilled intervention: [x] Yes  [] No    Treatment/Activity Tolerance:  [x] Patient able to complete treatment  [] Patient limited by fatigue  [] Patient limited by pain    [] Patient limited by other medical complications  [] Other:     ASSESSMENT: Patient s/p L TKR on 11/16/21. Presents with pain, limited ROM, strength and functional mobility as expected s/p post-op TKR. Increased AROM noted this visit. Pt requires skilled intervention to restore ROM, strength, functional endurance and balance in order to perform ADLs without significant symptoms or limitations. Return to Play: (if applicable)   []  Stage 1: Intro to Strength   []  Stage 2: Return to Run and Strength   []  Stage 3: Return to Jump and Strength   []  Stage 4: Dynamic Strength and Agility   []  Stage 5: Sport Specific Training     []  Ready to Return to Play, Meets All Above Stages   []  Not Ready for Return to Sports   Comments:                               PLAN: See eval. Cont PT 2-3x/week. Progress ROM/functional mobility as tolerated. Emphasis on CKC extension strengthening N.V.  [x] Continue per plan of care [] Alter current plan (see comments above)  [] Plan of care initiated [] Hold pending MD visit [] Discharge      Electronically signed by:  Consuelo Iniguez, [address] [place] [address] Road    Note: If patient does not return for scheduled/ recommended follow up visits, this note will serve as a discharge from care along with most recent update on progress.

## 2021-12-20 ENCOUNTER — TELEPHONE (OUTPATIENT)
Dept: ORTHOPEDIC SURGERY | Age: 71
End: 2021-12-20

## 2021-12-20 ENCOUNTER — HOSPITAL ENCOUNTER (OUTPATIENT)
Dept: PHYSICAL THERAPY | Age: 71
Setting detail: THERAPIES SERIES
Discharge: HOME | End: 2021-12-20
Payer: MEDICARE

## 2021-12-20 PROCEDURE — 97112 NEUROMUSCULAR REEDUCATION: CPT | Performed by: PHYSICAL THERAPIST

## 2021-12-20 PROCEDURE — 97110 THERAPEUTIC EXERCISES: CPT | Performed by: PHYSICAL THERAPIST

## 2021-12-20 PROCEDURE — 97016 VASOPNEUMATIC DEVICE THERAPY: CPT | Performed by: PHYSICAL THERAPIST

## 2021-12-20 NOTE — FLOWSHEET NOTE
2-3/10   Functional questionnaire LEFS 100% 46%   Functional Testing       Knee flex 78 110   ROM Knee ext -3 -2         Strength Knee Ext HHD NT 42 lbs    Hip abd HHD NT 33lbs      RESTRICTIONS/PRECAUTIONS: R TKR x6-7 years ago     Exercises/Interventions: Progressed HEP    Therapeutic Ex (69710) Sets/sec Reps Notes/CUES   QS, GS AP  10 reps HEP   Heelslides in sitting and supine with strap H10 10 reps  deg HEP   Hip flexor stretches off EOB with strap + to HEP   SLR with min assist HEP   Hip abduction HEP   Sitting HS stretches HEP   Sitting GS  Stretches with strap H30 5 HEP   Prone knee ext hang off table  5'    Prone TKE with bolster  H5 2x15 reps          EZ chair for flexion Bike for ROM #8 seat 6'  . Full revolution noted at end of session on #8 and #7   Zero knee prop with QS and GS stretches with strap h30 5x    Incline stretches H30 5x    Bridges with GTB  HEP   SL clams with GTB  HEP   Side stepping with LVL  2 laps + to HEP   Standing HR  Manual Intervention (20480)      Knee ext O.P  3 reps    Patellar mobs 5'                             NMR re-education (30588)   CUES NEEDED         Patient education/HEP/ gait and stair training 5'                 TKE with pilates reformer in standing with yellow spring  LP 80#    MH TKE 45#    CC retro walking 20#/side stepping 15# Toe-heel   True stretch HS 3D    Therapeutic Activity (34676)            FSU 4\"            FSU up and over 6\"/8\"  SL stance H10                  HEP instruction: (see scanned forms)  Access Code: [email or web address]  URL: [email or web address]  Date: 11/19/2021  Prepared by: Donn Pelayo     Exercises  Long Sitting Ankle Pumps - 5-7 x daily - 7 x weekly - 3 sets - 10 reps - 1-2 hold  Long Sitting Quad Set - 5-7 x daily - 7 x weekly - 1 sets - 10 reps - 10 hold  Long Sitting Calf Stretch with Strap - 2-3 x daily - 7 x weekly - 1 sets - 5 reps - 30\" hold  Seated Table Hamstring Stretch - 2-3 x daily - 7 x weekly - 1 sets - 5 reps - 30\" hold  Supine Straight Leg Raises - 2-3 x daily - 7 x weekly - 2 sets - 10 reps - 5 hold  Side Leg Lifts - 2-3 x daily - 7 x weekly - 2 sets - 10 reps - 5\" hold  Sitting Heel Slide with Towel - 2-3 x daily - 7 x weekly - 1 sets - 10 reps - 10\" hold  Supine Heel Slide with Strap - 2-3 x daily - 7 x weekly - 3 sets - 10 reps - 10\" hold     Access Code: [email or web address]  URL: [email or web address]  Date: 11/24/2021  Prepared by: Lotus Escalera    Exercises  Supine Bridge with Resistance Band - 2 x daily - 7 x weekly - 3 sets - 10 reps - 5 hold  Clamshell with Resistance - 2 x daily - 7 x weekly - 3 sets - 10 reps - 5 hold    Therapeutic Exercise and NMR EXR  [x] (01914) Provided verbal/tactile cueing for activities related to strengthening, flexibility, endurance, ROM for improvements in LE, proximal hip, and core control with self care, mobility, lifting, ambulation.  [] (62063) Provided verbal/tactile cueing for activities related to improving balance, coordination, kinesthetic sense, posture, motor skill, proprioception  to assist with LE, proximal hip, and core control in self care, mobility, lifting, ambulation and eccentric single leg control.      NMR and Therapeutic Activities:    [x] (42332 or 94824) Provided verbal/tactile cueing for activities related to improving balance, coordination, kinesthetic sense, posture, motor skill, proprioception and motor activation to allow for proper function of core, proximal hip and LE with self care and ADLs  [] (29380) Gait Re-education- Provided training and instruction to the patient for proper LE, core and proximal hip recruitment and positioning and eccentric body weight control with ambulation re-education including up and down stairs     Home Exercise Program:    [x] (89089) Reviewed/Progressed HEP activities related to strengthening, flexibility, endurance, ROM of core, proximal hip and LE for functional self-care, mobility, lifting and ambulation/stair navigation   [] (88193)Reviewed/Progressed HEP activities related to improving balance, coordination, kinesthetic sense, posture, motor skill, proprioception of core, proximal hip and LE for self care, mobility, lifting, and ambulation/stair navigation      Manual Treatments:  PROM / STM / Oscillations-Mobs:  G-I, II, III, IV (PA's, Inf., Post.)  [x] (23762) Provided manual therapy to mobilize LE, proximal hip and/or LS spine soft tissue/joints for the purpose of modulating pain, promoting relaxation,  increasing ROM, reducing/eliminating soft tissue swelling/inflammation/restriction, improving soft tissue extensibility and allowing for proper ROM for normal function with self care, mobility, lifting and ambulation. Modalities:   x15 min to L knee after treatment   [x] GAME READY (VASO)- for significant edema, swelling, pain control. Charges  Timed Code Treatment Minutes: 48'   Total Treatment Minutes: 72'     Medicare total (add KX at $2000)  800.00         [] EVAL (LOW) 05053   [] EVAL (MOD) 26023  [] EVAL (HIGH) 04558   [] RE-EVAL     [x] OZ(72681) x 2    [] IONTO  [x] NMR (51350) x 1    [x] VASO  [] Manual (59760) x      [] Other:  [] TA x      [] Mech Traction (71321)  [] ES(attended) (29068)      [] ES (un) (33814):         GOALS:  Patient stated goal: Get rid of pain  [x]? Progressing: []? Met: []? Not Met: []? Adjusted     Therapist goals for Patient:   Short Term Goals: To be achieved in: 2 weeks   1. Independent in HEP and progression per patient tolerance, in order to prevent re-injury. []? Progressing: [x]? Met: []? Not Met: []? Adjusted  2. Patient will have a decrease in pain to facilitate improvement in movement, function, and ADLs as indicated by Functional Deficits. [x]? Progressing: []? Met: []? Not Met: []? Adjusted     Long Term Goals: To be achieved in: 12 weeks  1. Disability index score of 45% or less for the LEFS to assist with reaching prior level of function. [x]? Progressing: []? Met: []?  Not Met: []? Adjusted  2. Patient will demonstrate increased AROM to 0-130 to allow for proper joint functioning as indicated by patients Functional Deficits. [x]? Progressing: []? Met: []? Not Met: []? Adjusted  3. Patient will demonstrate an increase in Strength to good proximal hip strength and control, within 5lb HHD in LE to allow for proper functional mobility as indicated by patients Functional Deficits. [x]? Progressing: []? Met: []? Not Met: []? Adjusted  4. Patient will return to walking with normalized gait pattern with or without appropriate assistive device functional activities without increased symptoms or restriction. [x]? Progressing: []? Met: []? Not Met: []? Adjusted  5. Reciprocal gait with stairs without pain(patient specific functional goal)    [x]? Progressing: []? Met: []? Not Met: []? Adjusted         Progression Towards Functional goals:  [x] Patient is progressing as expected towards functional goals listed. [] Progression is slowed due to complexities listed. [] Progression has been slowed due to co-morbidities. [] Plan just implemented, too soon to assess goals progression  [] Other:         Overall Progression Towards Functional goals/ Treatment Progress Update:  [x] Patient is progressing as expected towards functional goals listed. [] Progression is slowed due to complexities/Impairments listed. [] Progression has been slowed due to co-morbidities.   [] Plan just implemented, too soon to assess goals progression <30days   [] Goals require adjustment due to lack of progress  [] Patient is not progressing as expected and requires additional follow up with physician  [] Other    Prognosis for POC: [x] Good [] Fair  [] Poor      Patient requires continued skilled intervention: [x] Yes  [] No    Treatment/Activity Tolerance:  [x] Patient able to complete treatment  [] Patient limited by fatigue  [] Patient limited by pain    [] Patient limited by other medical complications  [] Other: ASSESSMENT: Patient s/p L TKR on 11/16/21. Presents with pain, limited ROM, strength and functional mobility as expected s/p post-op TKR. Increased AROM noted this visit. Patient making progress with ROM/strength. Missed last week of PT due to vacation but did not regress with ROM. Pt requires skilled intervention to restore ROM, strength, functional endurance and balance in order to perform ADLs without significant symptoms or limitations. Return to Play: (if applicable)   []  Stage 1: Intro to Strength   []  Stage 2: Return to Run and Strength   []  Stage 3: Return to Jump and Strength   []  Stage 4: Dynamic Strength and Agility   []  Stage 5: Sport Specific Training     []  Ready to Return to Play, Meets All Above Stages   []  Not Ready for Return to Sports   Comments:                               PLAN: See eval. Cont PT 2-3x/week. Progress ROM/functional mobility as tolerated. Emphasis on CKC extension strengthening N.V.  [x] Continue per plan of care [] Alter current plan (see comments above)  [] Plan of care initiated [] Hold pending MD visit [] Discharge      Electronically signed by:  Kiera Sharma, [address]    Note: If patient does not return for scheduled/ recommended follow up visits, this note will serve as a discharge from care along with most recent update on progress.

## 2021-12-21 ENCOUNTER — APPOINTMENT (OUTPATIENT)
Dept: PHYSICAL THERAPY | Age: 71
End: 2021-12-21
Payer: MEDICARE

## 2021-12-21 DIAGNOSIS — Z47.89 ORTHOPEDIC AFTERCARE: Primary | ICD-10-CM

## 2021-12-21 RX ORDER — TRAMADOL HYDROCHLORIDE 50 MG/1
50 TABLET ORAL EVERY 8 HOURS PRN
Qty: 20 TABLET | Refills: 0 | Status: SHIPPED | OUTPATIENT
Start: 2021-12-21 | End: 2021-12-28

## 2021-12-21 NOTE — TELEPHONE ENCOUNTER
Called patient to let him know that we did call in the medication. The patient was also informed that this would be the last medication refill of tramadol and if he continues to have pain then we will look for a referal to pain management. I encouraged him to continue working on knee extension and his exercises. He states that he had another assessment done by PT yesterday and \"thinks it went pretty well\". He is pleased with the results thus far.

## 2021-12-22 ENCOUNTER — HOSPITAL ENCOUNTER (OUTPATIENT)
Dept: PHYSICAL THERAPY | Age: 71
Setting detail: THERAPIES SERIES
Discharge: HOME | End: 2021-12-22
Payer: MEDICARE

## 2021-12-22 PROCEDURE — 97110 THERAPEUTIC EXERCISES: CPT | Performed by: PHYSICAL THERAPIST

## 2021-12-22 PROCEDURE — 97016 VASOPNEUMATIC DEVICE THERAPY: CPT | Performed by: PHYSICAL THERAPIST

## 2021-12-22 PROCEDURE — 97112 NEUROMUSCULAR REEDUCATION: CPT | Performed by: PHYSICAL THERAPIST

## 2021-12-22 NOTE — FLOWSHEET NOTE
[facility],  [address]  Phone: [phone]  Fax [phone]    Physical Therapy Treatment Note/ Progress Report:           Date:  2021    Patient Name:  Monica Thomas    :  1950  MRN: [id number]  Restrictions/Precautions:  R TKR x 6 years ago. Medical/Treatment Diagnosis Information:  · Diagnosis: M17.12 (ICD-10-CM) - Primary osteoarthritis of left knee s/p L TKR on 21  · Treatment Diagnosis: M17.12 (ICD-10-CM) - Primary osteoarthritis of left knee s/p L TKR on 21, L knee pain U18.690  Insurance/Certification information:  PT Insurance Information: /  Physician Information:  Referring Practitioner: Dr. Nikos Maria  Has the plan of care been signed (Y/N):        []  Yes  [x]  No     Date of Patient follow up with Physician:  21      Is this a Progress Report:     []  Yes  [x]  No        If Yes:  Date Range for reporting period:  Beginning; 21  Ending; 22    Progress report will be due (10 Rx or 30 days whichever is less): 34      Recertification will be due (POC Duration  / 90 days whichever is less): 12 weeks 22      Visit # Insurance Allowable Auth Required   In-person 10 / []  Yes ? []  No    Telehealth   []  Yes []  No    Total          Functional Scale: LEFS: 0/80 100%, 43/80 46%   Date assessed: 21, 21      Therapy Diagnosis/Practice Pattern: 4H      Number of Comorbidities:  []0     [x]1-2    []3    Latex Allergy:  [x]NO      []YES  Preferred Language for Healthcare:   [x]English       []other:      Pain level:  2-3/10     SUBJECTIVE:  Reports no new issues in general from last visit. Just achy in nature. S/p 5 weeks post-op today. OBJECTIVE: 21. Orthovitals performed.  22 ( 8 weeks post-op)   Observation:    Test measurements: Knee extension 0 with O.P. Flex: 120 with O.P  OBJECTIVE  Test used 21 Pain Summary 0-10 3-4/10 2-3/10   Functional questionnaire LEFS 100% 46%   Functional Testing       Knee flex 78 110   ROM Knee ext -3 -2         Strength Knee Ext HHD NT 42 lbs    Hip abd HHD NT 33lbs      RESTRICTIONS/PRECAUTIONS: R TKR x6-7 years ago     Exercises/Interventions: Progressed HEP    Therapeutic Ex (75015) Sets/sec Reps Notes/CUES   QS, GS AP  10 reps HEP   Heelslides in sitting and supine with strap H10 10 reps  deg HEP   Hip flexor stretches off EOB with strap + to HEP   SLR with min assist HEP   Hip abduction HEP   Sitting HS stretches HEP   Sitting GS  Stretches with strap H30 5 HEP   Prone knee ext hang off table  5'    Prone TKE with bolster  H5 2x15 reps          EZ chair for flexion 5'  110 on remote and 124 with goniometer. 0 on remote with extension at  Second notch   Bike for ROM #8 seat 6'  . Full revolution noted at end of session on #7   Zero knee prop with QS and GS stretches with strap h30 5x    Incline stretches H30 5x    Bridges with GTB  HEP   SL clams with GTB  HEP   Side stepping with LVL  2 laps + to HEP   Standing HR  Manual Intervention (96242)      Knee ext O.P  3 reps    Patellar mobs 5'                             NMR re-education (44807)   CUES NEEDED         Patient education/HEP/ gait and stair training 5'                 TKE with pilates reformer in standing with yellow spring  LP 90# H5 2x10 reps    MH TKE 45# H5 2x10 reps    CC retro walking 20#/side stepping 15# x6 laps Toe-heel   True stretch HS 3D    Therapeutic Activity (65775)            FSU 4\"      Hip hiking      FSU up and over 8\"  1x10 repsSL stance H10                  HEP instruction: (see scanned forms)  Access Code: [email or web address]  URL: [email or web address]  Date: 11/19/2021  Prepared by: Enmanuel Hammed     Exercises  Long Sitting Ankle Pumps - 5-7 x daily - 7 x weekly - 3 sets - 10 reps - 1-2 hold  Long Sitting Quad Set - 5-7 x daily - 7 x weekly - 1 sets - 10 reps - 10 hold  Long Sitting Calf Stretch with Strap - 2-3 x daily - 7 x weekly - 1 sets - 5 reps - 30\" hold  Seated Table Hamstring Stretch - 2-3 x daily - 7 x weekly - 1 sets - 5 reps - 30\" hold  Supine Straight Leg Raises - 2-3 x daily - 7 x weekly - 2 sets - 10 reps - 5 hold  Side Leg Lifts - 2-3 x daily - 7 x weekly - 2 sets - 10 reps - 5\" hold  Sitting Heel Slide with Towel - 2-3 x daily - 7 x weekly - 1 sets - 10 reps - 10\" hold  Supine Heel Slide with Strap - 2-3 x daily - 7 x weekly - 3 sets - 10 reps - 10\" hold     Access Code: [email or web address]  URL: [employer]  Date: 11/24/2021  Prepared by: Enmanuel Hammed    Exercises  Supine Bridge with Resistance Band - 2 x daily - 7 x weekly - 3 sets - 10 reps - 5 hold  Clamshell with Resistance - 2 x daily - 7 x weekly - 3 sets - 10 reps - 5 hold    Therapeutic Exercise and NMR EXR  [x] (25405) Provided verbal/tactile cueing for activities related to strengthening, flexibility, endurance, ROM for improvements in LE, proximal hip, and core control with self care, mobility, lifting, ambulation.  [] (90694) Provided verbal/tactile cueing for activities related to improving balance, coordination, kinesthetic sense, posture, motor skill, proprioception  to assist with LE, proximal hip, and core control in self care, mobility, lifting, ambulation and eccentric single leg control.      NMR and Therapeutic Activities:    [x] (43167 or 37500) Provided verbal/tactile cueing for activities related to improving balance, coordination, kinesthetic sense, posture, motor skill, proprioception and motor activation to allow for proper function of core, proximal hip and LE with self care and ADLs  [] (26337) Gait Re-education- Provided training and instruction to the patient for proper LE, core and proximal hip recruitment and positioning and eccentric body weight control with ambulation re-education including up and down stairs     Home Exercise Program:    [x] (08289) Reviewed/Progressed HEP activities related to strengthening, flexibility, endurance, ROM of core, proximal hip and LE for functional self-care, mobility, lifting and ambulation/stair navigation   [] (84743)Reviewed/Progressed HEP activities related to improving balance, coordination, kinesthetic sense, posture, motor skill, proprioception of core, proximal hip and LE for self care, mobility, lifting, and ambulation/stair navigation      Manual Treatments:  PROM / STM / Oscillations-Mobs:  G-I, II, III, IV (PA's, Inf., Post.)  [x] (81213) Provided manual therapy to mobilize LE, proximal hip and/or LS spine soft tissue/joints for the purpose of modulating pain, promoting relaxation,  increasing ROM, reducing/eliminating soft tissue swelling/inflammation/restriction, improving soft tissue extensibility and allowing for proper ROM for normal function with self care, mobility, lifting and ambulation. Modalities:   x15 min to L knee after treatment   [x] GAME READY (VASO)- for significant edema, swelling, pain control. Charges  Timed Code Treatment Minutes: 48'   Total Treatment Minutes: 72'     Medicare total (add KX at $2000)  900.00         [] EVAL (LOW) 26424   [] EVAL (MOD) 45841  [] EVAL (HIGH) 73410   [] RE-EVAL     [x] VF(96021) x 2    [] IONTO  [x] NMR (75272) x 1    [x] VASO  [] Manual (30210) x      [] Other:  [] TA x      [] Mech Traction (40097)  [] ES(attended) (00556)      [] ES (un) (39990):         GOALS:  Patient stated goal: Get rid of pain  [x]? Progressing: []? Met: []? Not Met: []? Adjusted     Therapist goals for Patient:   Short Term Goals: To be achieved in: 2 weeks   1. Independent in HEP and progression per patient tolerance, in order to prevent re-injury. []? Progressing: [x]? Met: []? Not Met: []? Adjusted  2. Patient will have a decrease in pain to facilitate improvement in movement, function, and ADLs as indicated by Functional Deficits. [x]? Progressing: []? Met: []? Not Met: []? Adjusted     Long Term Goals:  To be achieved in: 12 weeks  1. Disability index score of 45% or less for the LEFS to assist with reaching prior level of function. [x]? Progressing: []? Met: []? Not Met: []? Adjusted  2. Patient will demonstrate increased AROM to 0-130 to allow for proper joint functioning as indicated by patients Functional Deficits. [x]? Progressing: []? Met: []? Not Met: []? Adjusted  3. Patient will demonstrate an increase in Strength to good proximal hip strength and control, within 5lb HHD in LE to allow for proper functional mobility as indicated by patients Functional Deficits. [x]? Progressing: []? Met: []? Not Met: []? Adjusted  4. Patient will return to walking with normalized gait pattern with or without appropriate assistive device functional activities without increased symptoms or restriction. [x]? Progressing: []? Met: []? Not Met: []? Adjusted  5. Reciprocal gait with stairs without pain(patient specific functional goal)    [x]? Progressing: []? Met: []? Not Met: []? Adjusted         Progression Towards Functional goals:  [x] Patient is progressing as expected towards functional goals listed. [] Progression is slowed due to complexities listed. [] Progression has been slowed due to co-morbidities. [] Plan just implemented, too soon to assess goals progression  [] Other:         Overall Progression Towards Functional goals/ Treatment Progress Update:  [x] Patient is progressing as expected towards functional goals listed. [] Progression is slowed due to complexities/Impairments listed. [] Progression has been slowed due to co-morbidities.   [] Plan just implemented, too soon to assess goals progression <30days   [] Goals require adjustment due to lack of progress  [] Patient is not progressing as expected and requires additional follow up with physician  [] Other    Prognosis for POC: [x] Good [] Fair  [] Poor      Patient requires continued skilled intervention: [x] Yes  [] No    Treatment/Activity Tolerance:  [x] Patient able to complete treatment  [] Patient limited by fatigue  [] Patient limited by pain    [] Patient limited by other medical complications  [] Other:     ASSESSMENT: Patient s/p L TKR on 11/16/21. Presents with pain, limited ROM, strength and functional mobility as expected s/p post-op TKR. Increased AROM noted this visit. Patient making progress with ROM/strength. Increased ROM noted this visit. Janice Anthony Pt requires skilled intervention to restore ROM, strength, functional endurance and balance in order to perform ADLs without significant symptoms or limitations. Return to Play: (if applicable)   []  Stage 1: Intro to Strength   []  Stage 2: Return to Run and Strength   []  Stage 3: Return to Jump and Strength   []  Stage 4: Dynamic Strength and Agility   []  Stage 5: Sport Specific Training     []  Ready to Return to Play, Meets All Above Stages   []  Not Ready for Return to Sports   Comments:                               PLAN: See devyn. Ángel PT 2-3x/week. Progress ROM/functional mobility as tolerated. Emphasis on CKC extension strengthening N.V.  [x] Continue per plan of care [] Alter current plan (see comments above)  [] Plan of care initiated [] Hold pending MD visit [] Discharge      Electronically signed by:  Jeanne Jones, [address]    Note: If patient does not return for scheduled/ recommended follow up visits, this note will serve as a discharge from care along with most recent update on progress.

## 2021-12-27 ENCOUNTER — HOSPITAL ENCOUNTER (OUTPATIENT)
Dept: PHYSICAL THERAPY | Age: 71
Setting detail: THERAPIES SERIES
Discharge: HOME | End: 2021-12-27
Payer: MEDICARE

## 2021-12-27 PROCEDURE — 97110 THERAPEUTIC EXERCISES: CPT | Performed by: PHYSICAL THERAPIST

## 2021-12-27 PROCEDURE — 97112 NEUROMUSCULAR REEDUCATION: CPT | Performed by: PHYSICAL THERAPIST

## 2021-12-27 PROCEDURE — 97016 VASOPNEUMATIC DEVICE THERAPY: CPT | Performed by: PHYSICAL THERAPIST

## 2021-12-27 NOTE — FLOWSHEET NOTE
extension 0 with O.P. Flex: 123 with O.P  OBJECTIVE  Test used 11/19/21 12/20/21   Pain Summary 0-10 3-4/10 2-3/10   Functional questionnaire LEFS 100% 46%   Functional Testing       Knee flex 78 110   ROM Knee ext -3 -2         Strength Knee Ext HHD NT 42 lbs    Hip abd HHD NT 33lbs      RESTRICTIONS/PRECAUTIONS: R TKR x6-7 years ago     Exercises/Interventions: Progressed HEP    Therapeutic Ex (22687) Sets/sec Reps Notes/CUES   QS, GS AP  10 reps HEP   Heelslides in sitting and supine with strap H10 10 reps  deg HEP   Hip flexor stretches off EOB with strap + to HEP   SLR with min assist HEP   Hip abduction HEP   Sitting HS stretches HEP   Sitting GS  Stretches with strap H30 5 HEP   Prone knee ext hang off table  5'    Prone TKE with bolster  H5 2x15 reps          EZ chair for flexion 5'  118-120 on remote and 125-127 with goniometer. 0 on remote with extension at  Second notch   Bike for ROM #8 seat 6'  . Full revolution noted at end of session on #7   Zero knee prop with QS and GS stretches with strap h30 5x    Incline stretches H30 5x    Bridges with GTB  HEP   SL clams with GTB  HEP   Side stepping with LVL  2 laps + to HEP   Standing HR  Manual Intervention (32623)      Knee ext O.P  3 reps    Patellar mobs 5'                             NMR re-education (20280)   CUES NEEDED         Patient education/HEP/ gait and stair training 5'           MH hip abd 45# H5 2x10 reps    TKE with pilates reformer in standing with yellow spring  # H5 2x10 reps    MH TKE 45# H5 2x15 reps    CC retro walking 20#/side stepping 15#       True stretch HS 3D H15 3 reps    Therapeutic Activity (49497)            FSU 4\"      Hip hiking 4\"  2x10 reps    FSU up and over 8\"  1x10 repsSL stance H10                  HEP instruction: (see scanned forms)  Access Code: [email or web address]  URL: [email or web address]  Date: 11/19/2021  Prepared by: Avril Olea     Exercises  Long Sitting Ankle Pumps - 5-7 x daily - 7 x weekly - 3 sets - 10 including up and down stairs     Home Exercise Program:    [x] (84428) Reviewed/Progressed HEP activities related to strengthening, flexibility, endurance, ROM of core, proximal hip and LE for functional self-care, mobility, lifting and ambulation/stair navigation   [] (04557)Reviewed/Progressed HEP activities related to improving balance, coordination, kinesthetic sense, posture, motor skill, proprioception of core, proximal hip and LE for self care, mobility, lifting, and ambulation/stair navigation      Manual Treatments:  PROM / STM / Oscillations-Mobs:  G-I, II, III, IV (PA's, Inf., Post.)  [x] (62290) Provided manual therapy to mobilize LE, proximal hip and/or LS spine soft tissue/joints for the purpose of modulating pain, promoting relaxation,  increasing ROM, reducing/eliminating soft tissue swelling/inflammation/restriction, improving soft tissue extensibility and allowing for proper ROM for normal function with self care, mobility, lifting and ambulation. Modalities:   x15 min to L knee after treatment   [x] GAME READY (VASO)- for significant edema, swelling, pain control. Charges  Timed Code Treatment Minutes: 48'   Total Treatment Minutes: 72'     Medicare total (add KX at $2000)  1000.00         [] EVAL (LOW) 42431   [] EVAL (MOD) 46724  [] EVAL (HIGH) 95483   [] RE-EVAL     [x] JUAREZ(06636) x 2    [] IONTO  [x] NMR (96268) x 1    [x] VASO  [] Manual (14326) x      [] Other:  [] TA x      [] Mech Traction (82041)  [] ES(attended) (67064)      [] ES (un) (68651):         GOALS:  Patient stated goal: Get rid of pain  [x]? Progressing: []? Met: []? Not Met: []? Adjusted     Therapist goals for Patient:   Short Term Goals: To be achieved in: 2 weeks   1. Independent in HEP and progression per patient tolerance, in order to prevent re-injury. []? Progressing: [x]? Met: []? Not Met: []? Adjusted  2.  Patient will have a decrease in pain to facilitate improvement in movement, function, and ADLs as indicated by Functional Deficits. [x]? Progressing: []? Met: []? Not Met: []? Adjusted     Long Term Goals: To be achieved in: 12 weeks  1. Disability index score of 45% or less for the LEFS to assist with reaching prior level of function. [x]? Progressing: []? Met: []? Not Met: []? Adjusted  2. Patient will demonstrate increased AROM to 0-130 to allow for proper joint functioning as indicated by patients Functional Deficits. [x]? Progressing: []? Met: []? Not Met: []? Adjusted  3. Patient will demonstrate an increase in Strength to good proximal hip strength and control, within 5lb HHD in LE to allow for proper functional mobility as indicated by patients Functional Deficits. [x]? Progressing: []? Met: []? Not Met: []? Adjusted  4. Patient will return to walking with normalized gait pattern with or without appropriate assistive device functional activities without increased symptoms or restriction. [x]? Progressing: []? Met: []? Not Met: []? Adjusted  5. Reciprocal gait with stairs without pain(patient specific functional goal)    [x]? Progressing: []? Met: []? Not Met: []? Adjusted         Progression Towards Functional goals:  [x] Patient is progressing as expected towards functional goals listed. [] Progression is slowed due to complexities listed. [] Progression has been slowed due to co-morbidities. [] Plan just implemented, too soon to assess goals progression  [] Other:         Overall Progression Towards Functional goals/ Treatment Progress Update:  [x] Patient is progressing as expected towards functional goals listed. [] Progression is slowed due to complexities/Impairments listed. [] Progression has been slowed due to co-morbidities.   [] Plan just implemented, too soon to assess goals progression <30days   [] Goals require adjustment due to lack of progress  [] Patient is not progressing as expected and requires additional follow up with physician  [] Other    Prognosis for POC: [x] Good [] Fair  [] Poor      Patient requires continued skilled intervention: [x] Yes  [] No    Treatment/Activity Tolerance:  [x] Patient able to complete treatment  [] Patient limited by fatigue  [] Patient limited by pain    [] Patient limited by other medical complications  [] Other:     ASSESSMENT: Patient s/p L TKR on 11/16/21. Presents with pain, limited ROM, strength and functional mobility as expected s/p post-op TKR. Increased AROM noted this visit. Patient making progress with ROM/strength. Increased ROM noted this visit. Vanesa Wright Pt requires skilled intervention to restore ROM, strength, functional endurance and balance in order to perform ADLs without significant symptoms or limitations. Return to Play: (if applicable)   []  Stage 1: Intro to Strength   []  Stage 2: Return to Run and Strength   []  Stage 3: Return to Jump and Strength   []  Stage 4: Dynamic Strength and Agility   []  Stage 5: Sport Specific Training     []  Ready to Return to Play, Meets All Above Stages   []  Not Ready for Return to Sports   Comments:                               PLAN: See eval. Ángel PT 2-3x/week. Progress ROM/functional mobility as tolerated. Emphasis on CKC extension strengthening N.V.  [x] Continue per plan of care [] Alter current plan (see comments above)  [] Plan of care initiated [] Hold pending MD visit [] Discharge      Electronically signed by:  Pratik Landon, [address]    Note: If patient does not return for scheduled/ recommended follow up visits, this note will serve as a discharge from care along with most recent update on progress. IV discontinued, cath removed intact

## 2021-12-29 ENCOUNTER — HOSPITAL ENCOUNTER (OUTPATIENT)
Dept: PHYSICAL THERAPY | Age: 71
Setting detail: THERAPIES SERIES
Discharge: HOME | End: 2021-12-29
Payer: MEDICARE

## 2021-12-29 PROCEDURE — 97110 THERAPEUTIC EXERCISES: CPT | Performed by: PHYSICAL THERAPIST

## 2021-12-29 PROCEDURE — 97112 NEUROMUSCULAR REEDUCATION: CPT | Performed by: PHYSICAL THERAPIST

## 2021-12-29 PROCEDURE — 97016 VASOPNEUMATIC DEVICE THERAPY: CPT | Performed by: PHYSICAL THERAPIST

## 2021-12-29 NOTE — FLOWSHEET NOTE
[facility], [address]  Phone: [phone]  Fax [phone]    Physical Therapy Treatment Note/ Progress Report:           Date:  2021    Patient Name:  Tian Mar    :  1950  MRN: [id number]  Restrictions/Precautions:  R TKR x 6 years ago. Medical/Treatment Diagnosis Information:  · Diagnosis: M17.12 (ICD-10-CM) - Primary osteoarthritis of left knee s/p L TKR on 21  · Treatment Diagnosis: M17.12 (ICD-10-CM) - Primary osteoarthritis of left knee s/p L TKR on 21, L knee pain D74.988  Insurance/Certification information:  PT Insurance Information: /  Physician Information:  Referring Practitioner: Dr. Ochoa Mail  Has the plan of care been signed (Y/N):        []  Yes  [x]  No     Date of Patient follow up with Physician:  21      Is this a Progress Report:     []  Yes  [x]  No        If Yes:  Date Range for reporting period:  Beginning; 21  Ending; 22    Progress report will be due (10 Rx or 30 days whichever is less):       Recertification will be due (POC Duration  / 90 days whichever is less): 12 weeks 22      Visit # Insurance Allowable Auth Required   In-person 12 / []  Yes ? []  No    Telehealth   []  Yes []  No    Total          Functional Scale: LEFS: 0/80 100%, 43/80 46%   Date assessed: 21, 21      Therapy Diagnosis/Practice Pattern: 4H      Number of Comorbidities:  []0     [x]1-2    []3    Latex Allergy:  [x]NO      []YES  Preferred Language for Healthcare:   [x]English       []other:      Pain level:  2-3/10     SUBJECTIVE:  Reports no new issues in general from last visit. Just achy in nature. S/p 6 weeks post-op tomorrow. Going to go to [place] next week for a few weeks after therapy next week. OBJECTIVE: 21. Orthovitals performed.  22 ( 8 weeks post-op)    Observation:    Test measurements: Knee extension 0 with O.P. Flex: 123 with O.P  OBJECTIVE  Test used 11/19/21 12/20/21   Pain Summary 0-10 3-4/10 2-3/10   Functional questionnaire LEFS 100% 46%   Functional Testing       Knee flex 78 110   ROM Knee ext -3 -2         Strength Knee Ext HHD NT 42 lbs    Hip abd HHD NT 33lbs      RESTRICTIONS/PRECAUTIONS: R TKR x6-7 years ago     Exercises/Interventions: Progressed HEP    Therapeutic Ex (29983) Sets/sec Reps Notes/CUES   QS, GS AP  10 reps HEP   Heelslides in sitting and supine with strap H10 10 reps  deg HEP   Hip flexor stretches off EOB with strap + to HEP   SLR with min assist HEP   Hip abduction HEP   Sitting HS stretches HEP   Sitting GS  Stretches with strap H30 5 HEP   Prone knee ext hang off table  5'    Prone TKE with bolster  H5 2x15 reps          EZ chair for flexion 5'  120 on remote and 127-128 with goniometer. 0 on remote with extension at  Second notch   Bike for ROM #7 seat 6'  . Full rev   Zero knee prop with QS and GS stretches with strap    Incline stretches H30 5x    Bridges with GTB  HEP   SL clams with GTB  HEP   Side stepping with LVL  2 laps + to HEP   Standing HR  Manual Intervention (24391)      Knee ext O.P  3 reps    Patellar mobs 5'                             NMR re-education (68771)   CUES NEEDED         Patient education/HEP/ gait and stair training 5'           MH hip abd 45# H5 2x10 reps      #/eccentrics 80#  H5 2x15 reps    MH TKE 60# H5 2x15 reps    CC retro walking 20#/side stepping 15#       True stretch HS 3D H15 3 reps    Therapeutic Activity (12811)            FSU 4\"      Hip hiking 4\"  2x10 reps    FSU up and over 8\"  SL stance H10    LSD 4\"  2x10 reps            HEP instruction: (see scanned forms)  Access Code: [email or web address]  URL: [email or web address]  Date: 11/19/2021  Prepared by: Chuck Diez     Exercises  Long Sitting Ankle Pumps - 5-7 x daily - 7 x weekly - 3 sets - 10 reps - 1-2 hold  Long Sitting Quad Set - 5-7 x daily - 7 x weekly - 1 sets - 10 reps - 10 hold  Long Sitting Calf Stretch with Strap - 2-3 x daily - 7 x weekly - 1 sets - 5 reps - 30\" hold  Seated Table Hamstring Stretch - 2-3 x daily - 7 x weekly - 1 sets - 5 reps - 30\" hold  Supine Straight Leg Raises - 2-3 x daily - 7 x weekly - 2 sets - 10 reps - 5 hold  Side Leg Lifts - 2-3 x daily - 7 x weekly - 2 sets - 10 reps - 5\" hold  Sitting Heel Slide with Towel - 2-3 x daily - 7 x weekly - 1 sets - 10 reps - 10\" hold  Supine Heel Slide with Strap - 2-3 x daily - 7 x weekly - 3 sets - 10 reps - 10\" hold     Access Code: [email or web address]  URL: [email or web address]  Date: 11/24/2021  Prepared by: Richelle Morgan    Exercises  Supine Bridge with Resistance Band - 2 x daily - 7 x weekly - 3 sets - 10 reps - 5 hold  Clamshell with Resistance - 2 x daily - 7 x weekly - 3 sets - 10 reps - 5 hold    Therapeutic Exercise and NMR EXR  [x] (34657) Provided verbal/tactile cueing for activities related to strengthening, flexibility, endurance, ROM for improvements in LE, proximal hip, and core control with self care, mobility, lifting, ambulation.  [] (12218) Provided verbal/tactile cueing for activities related to improving balance, coordination, kinesthetic sense, posture, motor skill, proprioception  to assist with LE, proximal hip, and core control in self care, mobility, lifting, ambulation and eccentric single leg control.      NMR and Therapeutic Activities:    [x] (58438 or 47691) Provided verbal/tactile cueing for activities related to improving balance, coordination, kinesthetic sense, posture, motor skill, proprioception and motor activation to allow for proper function of core, proximal hip and LE with self care and ADLs  [] (60633) Gait Re-education- Provided training and instruction to the patient for proper LE, core and proximal hip recruitment and positioning and eccentric body weight control with ambulation re-education including up and down stairs     Home Exercise Program:    [x] (69660) Reviewed/Progressed HEP activities related to strengthening, flexibility, endurance, ROM of core, proximal hip and LE for functional self-care, mobility, lifting and ambulation/stair navigation   [] (66109)Reviewed/Progressed HEP activities related to improving balance, coordination, kinesthetic sense, posture, motor skill, proprioception of core, proximal hip and LE for self care, mobility, lifting, and ambulation/stair navigation      Manual Treatments:  PROM / STM / Oscillations-Mobs:  G-I, II, III, IV (PA's, Inf., Post.)  [x] (51393) Provided manual therapy to mobilize LE, proximal hip and/or LS spine soft tissue/joints for the purpose of modulating pain, promoting relaxation,  increasing ROM, reducing/eliminating soft tissue swelling/inflammation/restriction, improving soft tissue extensibility and allowing for proper ROM for normal function with self care, mobility, lifting and ambulation. Modalities:   x15 min to L knee after treatment   [x] GAME READY (VASO)- for significant edema, swelling, pain control. Charges  Timed Code Treatment Minutes: 48'   Total Treatment Minutes: 72'     Medicare total (add KX at $2000)  1100.00         [] EVAL (LOW) 72447   [] EVAL (MOD) 74321  [] EVAL (HIGH) 16866   [] RE-EVAL     [x] JK(79390) x 2    [] IONTO  [x] NMR (31096) x 1    [x] VASO  [] Manual (26403) x      [] Other:  [] TA x      [] Mech Traction (98971)  [] ES(attended) (14704)      [] ES (un) (49907):         GOALS:  Patient stated goal: Get rid of pain  [x]? Progressing: []? Met: []? Not Met: []? Adjusted     Therapist goals for Patient:   Short Term Goals: To be achieved in: 2 weeks   1. Independent in HEP and progression per patient tolerance, in order to prevent re-injury. []? Progressing: [x]? Met: []? Not Met: []? Adjusted  2. Patient will have a decrease in pain to facilitate improvement in movement, function, and ADLs as indicated by Functional Deficits. [x]? Progressing: []? Met: []?  Not Met: []? Adjusted     Long Term Goals: To be achieved in: 12 weeks  1. Disability index score of 45% or less for the LEFS to assist with reaching prior level of function. [x]? Progressing: []? Met: []? Not Met: []? Adjusted  2. Patient will demonstrate increased AROM to 0-130 to allow for proper joint functioning as indicated by patients Functional Deficits. [x]? Progressing: []? Met: []? Not Met: []? Adjusted  3. Patient will demonstrate an increase in Strength to good proximal hip strength and control, within 5lb HHD in LE to allow for proper functional mobility as indicated by patients Functional Deficits. [x]? Progressing: []? Met: []? Not Met: []? Adjusted  4. Patient will return to walking with normalized gait pattern with or without appropriate assistive device functional activities without increased symptoms or restriction. [x]? Progressing: []? Met: []? Not Met: []? Adjusted  5. Reciprocal gait with stairs without pain(patient specific functional goal)    [x]? Progressing: []? Met: []? Not Met: []? Adjusted         Progression Towards Functional goals:  [x] Patient is progressing as expected towards functional goals listed. [] Progression is slowed due to complexities listed. [] Progression has been slowed due to co-morbidities. [] Plan just implemented, too soon to assess goals progression  [] Other:         Overall Progression Towards Functional goals/ Treatment Progress Update:  [x] Patient is progressing as expected towards functional goals listed. [] Progression is slowed due to complexities/Impairments listed. [] Progression has been slowed due to co-morbidities.   [] Plan just implemented, too soon to assess goals progression <30days   [] Goals require adjustment due to lack of progress  [] Patient is not progressing as expected and requires additional follow up with physician  [] Other    Prognosis for POC: [x] Good [] Fair  [] Poor      Patient requires continued skilled intervention: [x] Yes  [] No    Treatment/Activity Tolerance:  [x] Patient able to complete treatment  [] Patient limited by fatigue  [] Patient limited by pain    [] Patient limited by other medical complications  [] Other:     ASSESSMENT: Patient s/p L TKR on 11/16/21. Presents with pain, limited ROM, strength and functional mobility as expected s/p post-op TKR. Increased AROM noted this visit. Patient making progress with ROM/strength. Increased ROM noted this visit. [employer] Pt requires skilled intervention to restore ROM, strength, functional endurance and balance in order to perform ADLs without significant symptoms or limitations. Return to Play: (if applicable)   []  Stage 1: Intro to Strength   []  Stage 2: Return to Run and Strength   []  Stage 3: Return to Jump and Strength   []  Stage 4: Dynamic Strength and Agility   []  Stage 5: Sport Specific Training     []  Ready to Return to Play, Meets All Above Stages   []  Not Ready for Return to Sports   Comments:                               PLAN: See eval. Cont PT 2-3x/week. Progress ROM/functional mobility as tolerated. Emphasis on CKC. [x] Continue per plan of care [] Alter current plan (see comments above)  [] Plan of care initiated [] Hold pending MD visit [] Discharge      Electronically signed by:  Alcira Mullins, [address]    Note: If patient does not return for scheduled/ recommended follow up visits, this note will serve as a discharge from care along with most recent update on progress.

## 2022-01-03 ENCOUNTER — HOSPITAL ENCOUNTER (OUTPATIENT)
Dept: PHYSICAL THERAPY | Age: 72
Setting detail: THERAPIES SERIES
Discharge: HOME | End: 2022-01-03
Payer: MEDICARE

## 2022-01-03 PROCEDURE — 97112 NEUROMUSCULAR REEDUCATION: CPT | Performed by: PHYSICAL THERAPIST

## 2022-01-03 PROCEDURE — 97110 THERAPEUTIC EXERCISES: CPT | Performed by: PHYSICAL THERAPIST

## 2022-01-03 PROCEDURE — 97016 VASOPNEUMATIC DEVICE THERAPY: CPT | Performed by: PHYSICAL THERAPIST

## 2022-01-03 NOTE — FLOWSHEET NOTE
[facility], [address]  Phone: [phone]  Fax [phone]    Physical Therapy Treatment Note/ Progress Report:           Date:  1/3/2022    Patient Name:  Dayanna Lan    :  1950  MRN: [id number]  Restrictions/Precautions:  R TKR x 6 years ago. Medical/Treatment Diagnosis Information:  · Diagnosis: M17.12 (ICD-10-CM) - Primary osteoarthritis of left knee s/p L TKR on 21  · Treatment Diagnosis: M17.12 (ICD-10-CM) - Primary osteoarthritis of left knee s/p L TKR on 21, L knee pain E18.216  Insurance/Certification information:  PT Insurance Information: /CHIKI  Physician Information:  Referring Practitioner: Dr. Shar Almaguer  Has the plan of care been signed (Y/N):        []  Yes  [x]  No     Date of Patient follow up with Physician:  21      Is this a Progress Report:     []  Yes  [x]  No        If Yes:  Date Range for reporting period:  Beginning; 21  Ending; 22    Progress report will be due (10 Rx or 30 days whichever is less):       Recertification will be due (POC Duration  / 90 days whichever is less): 12 weeks 22      Visit # Insurance Allowable Auth Required   In-person 13 total  2022 / []  Yes ? []  No    Telehealth   []  Yes []  No    Total          Functional Scale: LEFS: 0/80 100%, 43/80 46%   Date assessed: 21, 21      Therapy Diagnosis/Practice Pattern: 4H      Number of Comorbidities:  []0     [x]1-2    []3    Latex Allergy:  [x]NO      []YES  Preferred Language for Healthcare:   [x]English       []other:      Pain level:  1-2/10     SUBJECTIVE:  Reports seeing MD this am for back. Having flare up with spinal stenosis. To be scheduled and TEZ. No new issues with knee in general. Sees MD tomorrow. Reports walking is getting easier and faster than before. OBJECTIVE: 21. Orthovitals performed.  22 ( 8 weeks post-op)  Observation:    Test measurements: Knee extension 0 . Flex: 130 with O.P , 125 AAROM with heel slide. OBJECTIVE  Test used 11/19/21 12/20/21 1/3/21   Pain Summary 0-10 3-4/10 2-3/10 1-2/10   Functional questionnaire LEFS 100% 46%    Functional Testing        Knee flex 78 110 125 with HS and strap   ROM Knee ext -3 -2 0           Strength Knee Ext HHD NT 42 lbs 49 lbs    Hip abd HHD NT 33lbs 40 lbs      RESTRICTIONS/PRECAUTIONS: R TKR x6-7 years ago     Exercises/Interventions: Progressed HEP    Therapeutic Ex (97107) Sets/sec Reps Notes/CUES   QS, GS AP  10 reps HEP   Heelslides in sitting and supine with strap H10 10 reps  deg HEP   Hip flexor stretches off EOB with strap + to HEP   SLR with min assist HEP   Hip abduction HEP   Sitting HS stretches HEP   Sitting GS  Stretches with strap H30 5 HEP   Prone knee ext hang off table  5'    Prone TKE with bolster  H5 2x15 reps          EZ chair for flexion 5'  122-125 on remote and 130 with goniometer. 0 on remote with extension at  Second notch   Bike for ROM #7 seat 6'  . Full rev   Zero knee prop with QS and GS stretches with strap    Incline stretches H30 5x    Bridges with GTB  HEP   SL clams with GTB  HEP   Side stepping with LVL/monster walks  2 laps + to HEP   SB wall squats H5  2x10 reps    Standing HR  Manual Intervention (92158)      Knee ext O.P  3 reps    Patellar mobs 5'                             NMR re-education (83153)   CUES NEEDED         Patient education/HEP/ gait and stair training 5'           MH hip abd 45# H5 2x10 reps      #/eccentrics 80#  MH TKE 60# H5 2x15 reps    CC retro walking 20#/side stepping 15#       True stretch HS 3D H15 3 reps    Therapeutic Activity (10006)            FSU 4\"      Hip hiking 4\"  2x10 reps    FSU up and over 8\"  SL stance floor/airex H10 3 reps   LSD 4\"  2x15 reps            HEP instruction: (see scanned forms)  Access Code: [email or web address]  URL: [email or web address]  Date: 11/19/2021  Prepared by: Fouzia Peabody     Exercises  Long Sitting Ankle Pumps - 5-7 x daily - 7 x weekly - 3 sets - 10 reps - 1-2 hold  Long Sitting Quad Set - 5-7 x daily - 7 x weekly - 1 sets - 10 reps - 10 hold  Long Sitting Calf Stretch with Strap - 2-3 x daily - 7 x weekly - 1 sets - 5 reps - 30\" hold  Seated Table Hamstring Stretch - 2-3 x daily - 7 x weekly - 1 sets - 5 reps - 30\" hold  Supine Straight Leg Raises - 2-3 x daily - 7 x weekly - 2 sets - 10 reps - 5 hold  Side Leg Lifts - 2-3 x daily - 7 x weekly - 2 sets - 10 reps - 5\" hold  Sitting Heel Slide with Towel - 2-3 x daily - 7 x weekly - 1 sets - 10 reps - 10\" hold  Supine Heel Slide with Strap - 2-3 x daily - 7 x weekly - 3 sets - 10 reps - 10\" hold     Access Code: [email or web address]  URL: [email or web address]  Date: 11/24/2021  Prepared by: Fouzia Alejandrebody    Exercises  Supine Bridge with Resistance Band - 2 x daily - 7 x weekly - 3 sets - 10 reps - 5 hold  Clamshell with Resistance - 2 x daily - 7 x weekly - 3 sets - 10 reps - 5 hold    Therapeutic Exercise and NMR EXR  [x] (88723) Provided verbal/tactile cueing for activities related to strengthening, flexibility, endurance, ROM for improvements in LE, proximal hip, and core control with self care, mobility, lifting, ambulation.  [] (62269) Provided verbal/tactile cueing for activities related to improving balance, coordination, kinesthetic sense, posture, motor skill, proprioception  to assist with LE, proximal hip, and core control in self care, mobility, lifting, ambulation and eccentric single leg control.      NMR and Therapeutic Activities:    [x] (40608 or 25319) Provided verbal/tactile cueing for activities related to improving balance, coordination, kinesthetic sense, posture, motor skill, proprioception and motor activation to allow for proper function of core, proximal hip and LE with self care and ADLs  [] (57294) Gait Re-education- Provided training and instruction to the patient for proper LE, core and proximal hip recruitment and positioning and eccentric body weight control with ambulation re-education including up and down stairs     Home Exercise Program:    [x] (77705) Reviewed/Progressed HEP activities related to strengthening, flexibility, endurance, ROM of core, proximal hip and LE for functional self-care, mobility, lifting and ambulation/stair navigation   [] (70860)Reviewed/Progressed HEP activities related to improving balance, coordination, kinesthetic sense, posture, motor skill, proprioception of core, proximal hip and LE for self care, mobility, lifting, and ambulation/stair navigation      Manual Treatments:  PROM / STM / Oscillations-Mobs:  G-I, II, III, IV (PA's, Inf., Post.)  [x] (08643) Provided manual therapy to mobilize LE, proximal hip and/or LS spine soft tissue/joints for the purpose of modulating pain, promoting relaxation,  increasing ROM, reducing/eliminating soft tissue swelling/inflammation/restriction, improving soft tissue extensibility and allowing for proper ROM for normal function with self care, mobility, lifting and ambulation. Modalities:   x15 min to L knee after treatment   [x] GAME READY (VASO)- for significant edema, swelling, pain control. Charges  Timed Code Treatment Minutes: 48'   Total Treatment Minutes: 72'     Medicare total (add KX at $2000)  ~ 100         [] EVAL (LOW) 59133   [] EVAL (MOD) 14081  [] EVAL (HIGH) 89792   [] RE-EVAL     [x] IY(27442) x 2    [] IONTO  [x] NMR (40461) x 1    [x] VASO  [] Manual (42805) x      [] Other:  [] TA x      [] Mech Traction (19941)  [] ES(attended) (64731)      [] ES (un) (72797):         GOALS:  Patient stated goal: Get rid of pain  [x]? Progressing: []? Met: []? Not Met: []? Adjusted     Therapist goals for Patient:   Short Term Goals: To be achieved in: 2 weeks   1. Independent in HEP and progression per patient tolerance, in order to prevent re-injury. []? Progressing: [x]? Met: []? Not Met: []? Adjusted  2.  Patient will have a decrease in pain to facilitate improvement in movement, function, and ADLs as indicated by Functional Deficits. [x]? Progressing: []? Met: []? Not Met: []? Adjusted     Long Term Goals: To be achieved in: 12 weeks  1. Disability index score of 45% or less for the LEFS to assist with reaching prior level of function. [x]? Progressing: []? Met: []? Not Met: []? Adjusted  2. Patient will demonstrate increased AROM to 0-130 to allow for proper joint functioning as indicated by patients Functional Deficits. [x]? Progressing: []? Met: []? Not Met: []? Adjusted  3. Patient will demonstrate an increase in Strength to good proximal hip strength and control, within 5lb HHD in LE to allow for proper functional mobility as indicated by patients Functional Deficits. [x]? Progressing: []? Met: []? Not Met: []? Adjusted  4. Patient will return to walking with normalized gait pattern with or without appropriate assistive device functional activities without increased symptoms or restriction. [x]? Progressing: []? Met: []? Not Met: []? Adjusted  5. Reciprocal gait with stairs without pain(patient specific functional goal)    [x]? Progressing: []? Met: []? Not Met: []? Adjusted         Progression Towards Functional goals:  [x] Patient is progressing as expected towards functional goals listed. [] Progression is slowed due to complexities listed. [] Progression has been slowed due to co-morbidities. [] Plan just implemented, too soon to assess goals progression  [] Other:         Overall Progression Towards Functional goals/ Treatment Progress Update:  [x] Patient is progressing as expected towards functional goals listed. [] Progression is slowed due to complexities/Impairments listed. [] Progression has been slowed due to co-morbidities.   [] Plan just implemented, too soon to assess goals progression <30days   [] Goals require adjustment due to lack of progress  [] Patient is not progressing as expected and requires additional follow up with physician  [] Other    Prognosis for POC: [x] Good [] Fair  [] Poor      Patient requires continued skilled intervention: [x] Yes  [] No    Treatment/Activity Tolerance:  [x] Patient able to complete treatment  [] Patient limited by fatigue  [] Patient limited by pain    [] Patient limited by other medical complications  [] Other:     ASSESSMENT: Patient s/p L TKR on 11/16/21. Presents with pain, limited ROM, strength and functional mobility as expected s/p post-op TKR. Increased AROM noted this visit. Patient making progress with ROM/strength. Increased ROM noted this visit. [employer] Pt requires skilled intervention to restore ROM, strength, functional endurance and balance in order to perform ADLs without significant symptoms or limitations. Return to Play: (if applicable)   []  Stage 1: Intro to Strength   []  Stage 2: Return to Run and Strength   []  Stage 3: Return to Jump and Strength   []  Stage 4: Dynamic Strength and Agility   []  Stage 5: Sport Specific Training     []  Ready to Return to Play, Meets All Above Stages   []  Not Ready for Return to Sports   Comments:                               PLAN: See eval. Cont PT 2-3x/week. Progress ROM/functional mobility as tolerated. Emphasis on CKC. [x] Continue per plan of care [] Alter current plan (see comments above)  [] Plan of care initiated [] Hold pending MD visit [] Discharge      Electronically signed by:  Jennings Peabody, [address]    Note: If patient does not return for scheduled/ recommended follow up visits, this note will serve as a discharge from care along with most recent update on progress.

## 2022-01-04 ENCOUNTER — OFFICE VISIT (OUTPATIENT)
Dept: ORTHOPEDIC SURGERY | Age: 72
End: 2022-01-04

## 2022-01-04 VITALS — BODY MASS INDEX: 29.8 KG/M2 | WEIGHT: 220 LBS | HEIGHT: 72 IN

## 2022-01-04 DIAGNOSIS — Z47.89 ORTHOPEDIC AFTERCARE: Primary | ICD-10-CM

## 2022-01-04 PROCEDURE — 99024 POSTOP FOLLOW-UP VISIT: CPT | Performed by: ORTHOPAEDIC SURGERY

## 2022-01-04 NOTE — PROGRESS NOTES
POST OPERATIVE ORTHOPAEDIC NOTE     DOS: 11/16/2021  PROCEDURES: Left total knee arthroplasty     The patient has been recovering. The patient states the pain is 0/10 pain. He had an uneventful trip to [place]. He states he did use the knee extension board but did not necessarily care for it. He otherwise started to use prone hangs and has been going diligently to physical therapy. He is pleased with the results. He states occasional discomfort but denies anything of significance with ambulation.     Focused pertinent physical examination of the operative extremity:  Wounds C/D/I, well healed surgical incision without erythema or drainage  0/122/0 active range of motion  Stable to varus and valgus at 0 and 30 degrees  No swelling in the leg. Nontender to palpation calf. Skin intact throughout  5/5 IP Q H TA G EHL  SILT DP SP LP MP S S  +2 DP pulse     Radiographs: 3 view left knee 1/4/22: No gross interval change in placement of implants. Negative fracture. Satisfactory alignment. Patella aligned      Diagnosis Orders   1. Orthopedic aftercare  XR KNEE LEFT (3 VIEWS)       Assessment and plan: The patient is now 6 weeks status post the above listed procedure and is recovering    . --Greater than 50% of the time (11/20 minutes) was spent coordinating care and discussing postoperative recovery course  -I had a pleasant discussion with the patient. I reviewed with him that currently his clinical examination is well-appearing. His range of motion continues to improve and he has full terminal extension. He denies pain with ambulation and only occasional achiness at nighttime. He is otherwise very pleased with the functional range of motion and he states he is able to go step over step up and down stairs without limitation.  -I want him to continue to work with formal physical therapy. I also advocated he continue to do prone hangs to continue to work on full terminal knee extension.   Also to continue to work on terminal knee flexion as well. -OTC Tylenol per bottle parent discomfort.   -All questions answered to the patient's satisfaction and the patient expressed understanding and agreement with the above listed treatment plan  -Follow up in 6 weeks for a 3mo postop visit without radiographs   -Thank you for the clinical consultation and allowing me to participate in the patient's care.         Electronically signed by Shantal Powell MD on 1/4/22 at 1:40 PM EST         Shantal Powell MD       Orthopaedic Surgery-Sports Medicine      Disclaimer: This note was dictated with voice recognition software. Though review and correction are routinely performed, please contact the office/medical records for any errors requiring correction.

## 2022-01-05 ENCOUNTER — HOSPITAL ENCOUNTER (OUTPATIENT)
Dept: PHYSICAL THERAPY | Age: 72
Setting detail: THERAPIES SERIES
Discharge: HOME | End: 2022-01-05
Payer: MEDICARE

## 2022-01-05 PROCEDURE — 97112 NEUROMUSCULAR REEDUCATION: CPT | Performed by: PHYSICAL THERAPIST

## 2022-01-05 PROCEDURE — 97016 VASOPNEUMATIC DEVICE THERAPY: CPT | Performed by: PHYSICAL THERAPIST

## 2022-01-05 PROCEDURE — 97110 THERAPEUTIC EXERCISES: CPT | Performed by: PHYSICAL THERAPIST

## 2022-01-05 NOTE — FLOWSHEET NOTE
[facility], [address]  Phone: [phone]  Fax [phone]    Physical Therapy Treatment Note/ Progress Report:           Date:  2022    Patient Name:  Caron Bautista    :  1950  MRN: [id number]  Restrictions/Precautions:  R TKR x 6 years ago. Medical/Treatment Diagnosis Information:  · Diagnosis: M17.12 (ICD-10-CM) - Primary osteoarthritis of left knee s/p L TKR on 21  · Treatment Diagnosis: M17.12 (ICD-10-CM) - Primary osteoarthritis of left knee s/p L TKR on 21, L knee pain K70.429  Insurance/Certification information:  PT Insurance Information: /  Physician Information:  Referring Practitioner: Dr. Juvenal Hernandez  Has the plan of care been signed (Y/N):        []  Yes  [x]  No     Date of Patient follow up with Physician:  F/U 6 weeks       Is this a Progress Report:     []  Yes  [x]  No        If Yes:  Date Range for reporting period:  Beginning; 21  Ending; 22    Progress report will be due (10 Rx or 30 days whichever is less): 60      Recertification will be due (POC Duration  / 90 days whichever is less): 12 weeks 22      Visit # Insurance Allowable Auth Required   In-person 14 total  2022 / []  Yes ? []  No    Telehealth   []  Yes []  No    Total          Functional Scale: LEFS: 0/80 100%, 43/80 46%   Date assessed: 21, 21      Therapy Diagnosis/Practice Pattern: 4H      Number of Comorbidities:  []0     [x]1-2    []3    Latex Allergy:  [x]NO      []YES  Preferred Language for Healthcare:   [x]English       []other:      Pain level:  1-210     SUBJECTIVE:  Reports seeing MD for knee. Leaving for [place] tomorrow. MD pleased with progress. Told not to play pickle ball. Does not want him running yet. OBJECTIVE: 21. Orthovitals performed. 22 ( 8 weeks post-op)    Observation:    Test measurements: Knee extension 0 .  Flex: 130 with O.P , 125 AAROM with heel slide. OBJECTIVE  Test used 11/19/21 12/20/21 1/3/21   Pain Summary 0-10 3-4/10 2-3/10 1-2/10   Functional questionnaire LEFS 100% 46%    Functional Testing        Knee flex 78 110 125 with HS and strap   ROM Knee ext -3 -2 0           Strength Knee Ext HHD NT 42 lbs 49 lbs    Hip abd HHD NT 33lbs 40 lbs      RESTRICTIONS/PRECAUTIONS: R TKR x6-7 years ago     Exercises/Interventions: Progressed HEP    Therapeutic Ex (84121) Sets/sec Reps Notes/CUES   QS, GS AP  10 reps HEP   Heelslides in sitting and supine with strap H10 10 reps  deg HEP   Hip flexor stretches off EOB with strap + to HEP   SLR with min assist HEP   Hip abduction HEP   Sitting HS stretches HEP   Sitting GS  Stretches with strap H30 5 HEP   Prone knee ext hang off table  5'    Prone TKE with bolster  H5 2x15 reps          EZ chair for flexion 5'  122-125 on remote and 130 with goniometer. 0 on remote with extension at  Second notch   Bike for ROM #7 seat 6'  . Full rev   Zero knee prop with QS and GS stretches with strap    Incline stretches H30 5x    Bridges with GTB  HEP   SL clams with GTB  HEP   Side stepping with LVL/monster walks   + to HEP   SB wall squats    Standing HR  Manual Intervention (36632)      Knee ext O.P  3 reps    Patellar mobs 5'                             NMR re-education (88709)   CUES NEEDED         Patient education/HEP/ gait and stair training 5'           MH hip abd 50# H5 2x10 reps      #/eccentrics 90#  H5 2x15 reps MH TKE 60# H5 2x15 reps    CC retro walking 20#/side stepping 15# with SLS x8 laps Toe-heel       True stretch HS 3D H15 3 reps    Therapeutic Activity (85678)            FSU 4\"      Hip hiking 4\"  2x10 reps    FSU up and over 8\"  SL stance floor/airex H10 3 reps   LSD 4\"   2x15 reps            HEP instruction: (see scanned forms)  Access Code: [email or web address]  URL: [email or web address]  Date: 11/19/2021  Prepared by: Uzair London     Exercises  Long Sitting Ankle Pumps - 5-7 x daily - 7 x weekly - 3 sets - 10 reps - 1-2 hold  Long Sitting Quad Set - 5-7 x daily - 7 x weekly - 1 sets - 10 reps - 10 hold  Long Sitting Calf Stretch with Strap - 2-3 x daily - 7 x weekly - 1 sets - 5 reps - 30\" hold  Seated Table Hamstring Stretch - 2-3 x daily - 7 x weekly - 1 sets - 5 reps - 30\" hold  Supine Straight Leg Raises - 2-3 x daily - 7 x weekly - 2 sets - 10 reps - 5 hold  Side Leg Lifts - 2-3 x daily - 7 x weekly - 2 sets - 10 reps - 5\" hold  Sitting Heel Slide with Towel - 2-3 x daily - 7 x weekly - 1 sets - 10 reps - 10\" hold  Supine Heel Slide with Strap - 2-3 x daily - 7 x weekly - 3 sets - 10 reps - 10\" hold     Access Code: [email or web address]  URL: [email or web address]  Date: 11/24/2021  Prepared by: Goldie Goodson    Exercises  Supine Bridge with Resistance Band - 2 x daily - 7 x weekly - 3 sets - 10 reps - 5 hold  Clamshell with Resistance - 2 x daily - 7 x weekly - 3 sets - 10 reps - 5 hold    Therapeutic Exercise and NMR EXR  [x] (87067) Provided verbal/tactile cueing for activities related to strengthening, flexibility, endurance, ROM for improvements in LE, proximal hip, and core control with self care, mobility, lifting, ambulation.  [] (26402) Provided verbal/tactile cueing for activities related to improving balance, coordination, kinesthetic sense, posture, motor skill, proprioception  to assist with LE, proximal hip, and core control in self care, mobility, lifting, ambulation and eccentric single leg control.      NMR and Therapeutic Activities:    [x] (67863 or 76603) Provided verbal/tactile cueing for activities related to improving balance, coordination, kinesthetic sense, posture, motor skill, proprioception and motor activation to allow for proper function of core, proximal hip and LE with self care and ADLs  [] (21971) Gait Re-education- Provided training and instruction to the patient for proper LE, core and proximal hip recruitment and positioning and eccentric body weight control with ambulation re-education including up and down stairs     Home Exercise Program:    [x] (93002) Reviewed/Progressed HEP activities related to strengthening, flexibility, endurance, ROM of core, proximal hip and LE for functional self-care, mobility, lifting and ambulation/stair navigation   [] (04133)Reviewed/Progressed HEP activities related to improving balance, coordination, kinesthetic sense, posture, motor skill, proprioception of core, proximal hip and LE for self care, mobility, lifting, and ambulation/stair navigation      Manual Treatments:  PROM / STM / Oscillations-Mobs:  G-I, II, III, IV (PA's, Inf., Post.)  [x] (04109) Provided manual therapy to mobilize LE, proximal hip and/or LS spine soft tissue/joints for the purpose of modulating pain, promoting relaxation,  increasing ROM, reducing/eliminating soft tissue swelling/inflammation/restriction, improving soft tissue extensibility and allowing for proper ROM for normal function with self care, mobility, lifting and ambulation. Modalities:   x15 min to L knee after treatment   [x] GAME READY (VASO)- for significant edema, swelling, pain control. Charges  Timed Code Treatment Minutes: 48'   Total Treatment Minutes: 72'     Medicare total (add KX at $2000)  ~ 200         [] EVAL (LOW) 90256   [] EVAL (MOD) 29599  [] EVAL (HIGH) 57696   [] RE-EVAL     [x] RN(01355) x 2    [] IONTO  [x] NMR (75659) x 1    [x] VASO  [] Manual (57894) x      [] Other:  [] TA x      [] Mech Traction (58763)  [] ES(attended) (87889)      [] ES (un) (32129):         GOALS:  Patient stated goal: Get rid of pain  [x]? Progressing: []? Met: []? Not Met: []? Adjusted     Therapist goals for Patient:   Short Term Goals: To be achieved in: 2 weeks   1. Independent in HEP and progression per patient tolerance, in order to prevent re-injury. []? Progressing: [x]? Met: []? Not Met: []? Adjusted  2.  Patient will have a decrease in pain to facilitate improvement in movement, function, and ADLs as indicated by Functional Deficits. [x]? Progressing: []? Met: []? Not Met: []? Adjusted     Long Term Goals: To be achieved in: 12 weeks  1. Disability index score of 45% or less for the LEFS to assist with reaching prior level of function. [x]? Progressing: []? Met: []? Not Met: []? Adjusted  2. Patient will demonstrate increased AROM to 0-130 to allow for proper joint functioning as indicated by patients Functional Deficits. [x]? Progressing: []? Met: []? Not Met: []? Adjusted  3. Patient will demonstrate an increase in Strength to good proximal hip strength and control, within 5lb HHD in LE to allow for proper functional mobility as indicated by patients Functional Deficits. [x]? Progressing: []? Met: []? Not Met: []? Adjusted  4. Patient will return to walking with normalized gait pattern with or without appropriate assistive device functional activities without increased symptoms or restriction. [x]? Progressing: []? Met: []? Not Met: []? Adjusted  5. Reciprocal gait with stairs without pain(patient specific functional goal)    [x]? Progressing: []? Met: []? Not Met: []? Adjusted         Progression Towards Functional goals:  [x] Patient is progressing as expected towards functional goals listed. [] Progression is slowed due to complexities listed. [] Progression has been slowed due to co-morbidities. [] Plan just implemented, too soon to assess goals progression  [] Other:         Overall Progression Towards Functional goals/ Treatment Progress Update:  [x] Patient is progressing as expected towards functional goals listed. [] Progression is slowed due to complexities/Impairments listed. [] Progression has been slowed due to co-morbidities.   [] Plan just implemented, too soon to assess goals progression <30days   [] Goals require adjustment due to lack of progress  [] Patient is not progressing as expected and requires additional follow up with physician  [] Other    Prognosis for POC: [x] Good [] Fair  [] Poor      Patient requires continued skilled intervention: [x] Yes  [] No    Treatment/Activity Tolerance:  [x] Patient able to complete treatment  [] Patient limited by fatigue  [] Patient limited by pain    [] Patient limited by other medical complications  [] Other:     ASSESSMENT: Patient s/p L TKR on 11/16/21. Presents with pain, limited ROM, strength and functional mobility as expected s/p post-op TKR. Increased AROM noted this visit. Patient making progress with ROM/strength. Increased ROM noted this visit. Mitul Mejia Pt requires skilled intervention to restore ROM, strength, functional endurance and balance in order to perform ADLs without significant symptoms or limitations. Return to Play: (if applicable)   []  Stage 1: Intro to Strength   []  Stage 2: Return to Run and Strength   []  Stage 3: Return to Jump and Strength   []  Stage 4: Dynamic Strength and Agility   []  Stage 5: Sport Specific Training     []  Ready to Return to Play, Meets All Above Stages   []  Not Ready for Return to Sports   Comments:                               PLAN: See devyn. Cont PT 2-3x/week. Progress ROM/functional mobility as tolerated. Emphasis on CKC. F/U with patient on return from [place]. [x] Continue per plan of care [] Alter current plan (see comments above)  [] Plan of care initiated [] Hold pending MD visit [] Discharge      Electronically signed by:  Charmaine Mejia, [address]    Note: If patient does not return for scheduled/ recommended follow up visits, this note will serve as a discharge from care along with most recent update on progress.

## 2022-01-24 ENCOUNTER — APPOINTMENT (OUTPATIENT)
Dept: PHYSICAL THERAPY | Age: 72
End: 2022-01-24
Payer: MEDICARE

## 2022-01-26 ENCOUNTER — APPOINTMENT (OUTPATIENT)
Dept: PHYSICAL THERAPY | Age: 72
End: 2022-01-26
Payer: MEDICARE

## 2022-01-28 ENCOUNTER — APPOINTMENT (OUTPATIENT)
Dept: PHYSICAL THERAPY | Age: 72
End: 2022-01-28
Payer: MEDICARE

## 2022-01-31 ENCOUNTER — HOSPITAL ENCOUNTER (OUTPATIENT)
Dept: PHYSICAL THERAPY | Age: 72
Setting detail: THERAPIES SERIES
Discharge: HOME | End: 2022-01-31
Payer: MEDICARE

## 2022-01-31 PROCEDURE — 97110 THERAPEUTIC EXERCISES: CPT | Performed by: PHYSICAL THERAPIST

## 2022-01-31 PROCEDURE — 97112 NEUROMUSCULAR REEDUCATION: CPT | Performed by: PHYSICAL THERAPIST

## 2022-01-31 NOTE — FLOWSHEET NOTE
[facility],  [address]  Phone: [phone]  Fax [phone]    Physical Therapy Treatment Note/ Progress Report:           Date:  2022    Patient Name:  Guanaco Dumont    :  1950  MRN: [id number]  Restrictions/Precautions:  R TKR x 6 years ago. Medical/Treatment Diagnosis Information:  · Diagnosis: M17.12 (ICD-10-CM) - Primary osteoarthritis of left knee s/p L TKR on 21  · Treatment Diagnosis: M17.12 (ICD-10-CM) - Primary osteoarthritis of left knee s/p L TKR on 21, L knee pain G10.955  Insurance/Certification information:  PT Insurance Information: /  Physician Information:  Referring Practitioner: Dr. Tiffany Long  Has the plan of care been signed (Y/N):        []  Yes  [x]  No     Date of Patient follow up with Physician:  None scheduled. Is this a Progress Report:     [x]  Yes  []  No        If Yes:  Date Range for reporting period:  Beginning; 21  Ending; 22    Progress report will be due (10 Rx or 30 days whichever is less): 37      Recertification will be due (POC Duration  / 90 days whichever is less): 12 weeks 22      Visit # Insurance Allowable Auth Required   In-person 15 total  3/2022 / []  Yes ? []  No    Telehealth   []  Yes []  No    Total          Functional Scale: LEFS: 0/80 100%, 43/80 46%,  69/80 14%     Date assessed: 21, 21, 22      Therapy Diagnosis/Practice Pattern: 4H      Number of Comorbidities:  []0     [x]1-2    []3    Latex Allergy:  [x]NO      []YES  Preferred Language for Healthcare:   [x]English       []other:      Pain level:  0-1/10     SUBJECTIVE:  Reports staying an extra week in [place]. Got back yesterday. No issues in general. Reports riding outdoor bike in [place]. Leaving for [facility] February. Patient 11 weeks post-op tomorrow.   OBJECTIVE:  Meghana Montes performed this visit on 22 due to patient out of town. 1/11/22 ( 8 weeks post-op) . Re-assessment performed this visit.  Observation:    Test measurements: Knee extension 0 . , 125 AAROM with heel slide. OBJECTIVE  Test used 11/19/21 12/20/21 1/3/22 1/31/22   Pain Summary 0-10 3-4/10 2-3/10 1-2/10 0-1/10   Functional questionnaire LEFS 100% 46%  14%   Functional Testing         Knee flex 78 110 125 with HS and strap 125 with HS and strap   ROM Knee ext -3 -2 0  -1    Girth MP   47.5cm 42.5 cm   Strength Knee Ext HHD NT 42 lbs 49 lbs 45lbs    Hip abd HHD NT 33lbs 40 lbs 38 lbs      RESTRICTIONS/PRECAUTIONS: R TKR x6-7 years ago     Exercises/Interventions: Progressed HEP    Therapeutic Ex (77141) Sets/sec Reps Notes/CUES   QS, GS AP  10 reps HEP   Heelslides in sitting and supine with strap H10 10 reps  deg HEP   Hip flexor stretches off EOB with strap + to HEP   SLR with min assist HEP   Hip abduction HEP   Sitting HS stretches HEP   Sitting GS  Stretches with strap H30 5 HEP   Prone knee ext hang off table  5'    Prone TKE with bolster  H5 2x15 reps          EZ chair for flexion 5'  122-125 on remote and 130 with goniometer. 0 on remote with extension at  Second notch   Bike for ROM #7 seat 6'  .  Full rev   Zero knee prop with QS and GS stretches with strap    Incline stretches H30 5x    Bridges with GTB  HEP   SL clams with GTB  HEP   Side stepping with LVL/monster walks   + to HEP   SB wall squats    Standing HR  Manual Intervention (23508)      Knee ext O.P  3 reps    Patellar mobs 5'                             NMR re-education (37086)   CUES NEEDED         Patient education/HEP/ gait and stair training 5'           MH hip abd 50# H5 2x10 reps      #/eccentrics 90#  H5 2x15 reps MH TKE 60#    CC retro walking 20#/side stepping 15# with SLS Toe-heel     True stretch HS 3D    Therapeutic Activity (76540)            FSU 4\"      Hip hiking 4\"  2x10 reps    FSU up and over 8\"  SL stance floor/airex    LSD 4\"                 HEP instruction: (see scanned forms)  Access Code: [email or web address]  URL: [email or web address]  Date: 11/19/2021  Prepared by: Bella Mcduffie     Exercises  Long Sitting Ankle Pumps - 5-7 x daily - 7 x weekly - 3 sets - 10 reps - 1-2 hold  Long Sitting Quad Set - 5-7 x daily - 7 x weekly - 1 sets - 10 reps - 10 hold  Long Sitting Calf Stretch with Strap - 2-3 x daily - 7 x weekly - 1 sets - 5 reps - 30\" hold  Seated Table Hamstring Stretch - 2-3 x daily - 7 x weekly - 1 sets - 5 reps - 30\" hold  Supine Straight Leg Raises - 2-3 x daily - 7 x weekly - 2 sets - 10 reps - 5 hold  Side Leg Lifts - 2-3 x daily - 7 x weekly - 2 sets - 10 reps - 5\" hold  Sitting Heel Slide with Towel - 2-3 x daily - 7 x weekly - 1 sets - 10 reps - 10\" hold  Supine Heel Slide with Strap - 2-3 x daily - 7 x weekly - 3 sets - 10 reps - 10\" hold     Access Code: [email or web address]  URL: [email or web address]  Date: 11/24/2021  Prepared by: Bella Mcduffie    Exercises  Supine Bridge with Resistance Band - 2 x daily - 7 x weekly - 3 sets - 10 reps - 5 hold  Clamshell with Resistance - 2 x daily - 7 x weekly - 3 sets - 10 reps - 5 hold    Therapeutic Exercise and NMR EXR  [x] (97444) Provided verbal/tactile cueing for activities related to strengthening, flexibility, endurance, ROM for improvements in LE, proximal hip, and core control with self care, mobility, lifting, ambulation.  [] (05954) Provided verbal/tactile cueing for activities related to improving balance, coordination, kinesthetic sense, posture, motor skill, proprioception  to assist with LE, proximal hip, and core control in self care, mobility, lifting, ambulation and eccentric single leg control.      NMR and Therapeutic Activities:    [x] (72584 or 91730) Provided verbal/tactile cueing for activities related to improving balance, coordination, kinesthetic sense, posture, motor skill, proprioception and motor activation to allow for proper function of core, proximal hip and LE with self care and ADLs  [] (57306) Gait Re-education- Provided training and instruction to the patient for proper LE, core and proximal hip recruitment and positioning and eccentric body weight control with ambulation re-education including up and down stairs     Home Exercise Program:    [x] (50081) Reviewed/Progressed HEP activities related to strengthening, flexibility, endurance, ROM of core, proximal hip and LE for functional self-care, mobility, lifting and ambulation/stair navigation   [] (71928)Reviewed/Progressed HEP activities related to improving balance, coordination, kinesthetic sense, posture, motor skill, proprioception of core, proximal hip and LE for self care, mobility, lifting, and ambulation/stair navigation      Manual Treatments:  PROM / STM / Oscillations-Mobs:  G-I, II, III, IV (PA's, Inf., Post.)  [x] (43134) Provided manual therapy to mobilize LE, proximal hip and/or LS spine soft tissue/joints for the purpose of modulating pain, promoting relaxation,  increasing ROM, reducing/eliminating soft tissue swelling/inflammation/restriction, improving soft tissue extensibility and allowing for proper ROM for normal function with self care, mobility, lifting and ambulation. Modalities:   Deferred this visit. [] GAME READY (VASO)- for significant edema, swelling, pain control. Charges  Timed Code Treatment Minutes: 54'   Total Treatment Minutes: 54'     Medicare total (add KX at $2000)  ~ 300         [] EVAL (LOW) 71035   [] EVAL (MOD) 32547  [] EVAL (HIGH) 67098   [] RE-EVAL     [x] FO(11431) x 2    [] IONTO  [x] NMR (36147) x 2    [] VASO  [] Manual (01231) x      [] Other:  [] TA x      [] Mech Traction (40794)  [] ES(attended) (75693)      [] ES (un) (14710):         GOALS:  Patient stated goal: Get rid of pain  [x]? Progressing: []? Met: []? Not Met: []? Adjusted     Therapist goals for Patient:   Short Term Goals: To be achieved in: 2 weeks   1.  Independent in HEP and progression per patient tolerance, in order to prevent re-injury. []? Progressing: [x]? Met: []? Not Met: []? Adjusted  2. Patient will have a decrease in pain to facilitate improvement in movement, function, and ADLs as indicated by Functional Deficits. []? Progressing: [x]? Met: []? Not Met: []? Adjusted     Long Term Goals: To be achieved in: 12 weeks  1. Disability index score of 45% or less for the LEFS to assist with reaching prior level of function. []? Progressing: [x]? Met: []? Not Met: []? Adjusted  2. Patient will demonstrate increased AROM to 0-130 to allow for proper joint functioning as indicated by patients Functional Deficits. [x]? Progressing: []? Met: []? Not Met: []? Adjusted  3. Patient will demonstrate an increase in Strength to good proximal hip strength and control, within 5lb HHD in LE to allow for proper functional mobility as indicated by patients Functional Deficits. [x]? Progressing: []? Met: []? Not Met: []? Adjusted  4. Patient will return to walking with normalized gait pattern with or without appropriate assistive device functional activities without increased symptoms or restriction. []? Progressing: [x]? Met: []? Not Met: []? Adjusted  5. Reciprocal gait with stairs without pain(patient specific functional goal)    []? Progressing: [x]? Met: []? Not Met: []? Adjusted         Progression Towards Functional goals:  [x] Patient is progressing as expected towards functional goals listed. [] Progression is slowed due to complexities listed. [] Progression has been slowed due to co-morbidities. [] Plan just implemented, too soon to assess goals progression  [] Other:         Overall Progression Towards Functional goals/ Treatment Progress Update:  [x] Patient is progressing as expected towards functional goals listed. [] Progression is slowed due to complexities/Impairments listed. [] Progression has been slowed due to co-morbidities.   [] Plan just implemented, too soon to assess goals progression <30days   [] Goals require adjustment due to lack of progress  [] Patient is not progressing as expected and requires additional follow up with physician  [] Other    Prognosis for POC: [x] Good [] Fair  [] Poor      Patient requires continued skilled intervention: [x] Yes  [] No    Treatment/Activity Tolerance:  [x] Patient able to complete treatment  [] Patient limited by fatigue  [] Patient limited by pain    [] Patient limited by other medical complications  [] Other:     ASSESSMENT: Patient s/p L TKR on 11/16/21. Presents with pain, limited ROM, strength and functional mobility as expected s/p post-op TKR. Increased AROM noted this visit. Patient making progress with ROM/strength. Increased ROM noted this visit. Patient did not regress with ROM when out of town but still tight with end range flex/ext. Recommend a few additional weeks of PT before D/C for ROM. Pt requires skilled intervention to restore ROM, strength, functional endurance and balance in order to perform ADLs without significant symptoms or limitations. Return to Play: (if applicable)   []  Stage 1: Intro to Strength   []  Stage 2: Return to Run and Strength   []  Stage 3: Return to Jump and Strength   []  Stage 4: Dynamic Strength and Agility   []  Stage 5: Sport Specific Training     []  Ready to Return to Play, Meets All Above Stages   []  Not Ready for Return to Sports   Comments:                               PLAN: See eval. Cont PT 2-3x/week. Progress ROM/functional mobility as tolerated. Emphasis on CKC. [x] Continue per plan of care [] Alter current plan (see comments above)  [] Plan of care initiated [] Hold pending MD visit [] Discharge      Electronically signed by:  Uzair London, [address]    Note: If patient does not return for scheduled/ recommended follow up visits, this note will serve as a discharge from care along with most recent update on progress.

## 2022-02-02 ENCOUNTER — HOSPITAL ENCOUNTER (OUTPATIENT)
Dept: PHYSICAL THERAPY | Age: 72
Setting detail: THERAPIES SERIES
Discharge: HOME | End: 2022-02-02
Payer: MEDICARE

## 2022-02-02 PROCEDURE — 97112 NEUROMUSCULAR REEDUCATION: CPT | Performed by: PHYSICAL THERAPIST

## 2022-02-02 PROCEDURE — 97110 THERAPEUTIC EXERCISES: CPT | Performed by: PHYSICAL THERAPIST

## 2022-02-02 NOTE — FLOWSHEET NOTE
[facility],  [address]  Phone: [phone]  Fax [phone]    Physical Therapy Treatment Note/ Progress Report:           Date:  2022    Patient Name:  Karan Diggs    :  1950  MRN: [id number]  Restrictions/Precautions:  R TKR x 6 years ago. Medical/Treatment Diagnosis Information:  · Diagnosis: M17.12 (ICD-10-CM) - Primary osteoarthritis of left knee s/p L TKR on 21  · Treatment Diagnosis: M17.12 (ICD-10-CM) - Primary osteoarthritis of left knee s/p L TKR on 21, L knee pain W66.852  Insurance/Certification information:  PT Insurance Information: /  Physician Information:  Referring Practitioner: Dr. Karl Lindsey  Has the plan of care been signed (Y/N):        []  Yes  [x]  No     Date of Patient follow up with Physician:  None scheduled. Is this a Progress Report:     [x]  Yes  []  No        If Yes:  Date Range for reporting period:  Beginning; 21  Ending; 22    Progress report will be due (10 Rx or 30 days whichever is less): 5/9/10      Recertification will be due (POC Duration  / 90 days whichever is less): 12 weeks 22      Visit # Insurance Allowable Auth Required   In-person 15 total  3/2022 / []  Yes ? []  No    Telehealth   []  Yes []  No    Total          Functional Scale: LEFS: 0/80 100%, 43/80 46%,  69/80 14%     Date assessed: 21, 21, 22      Therapy Diagnosis/Practice Pattern: 4H      Number of Comorbidities:  []0     [x]1-2    []3    Latex Allergy:  [x]NO      []YES  Preferred Language for Healthcare:   [x]English       []other:      Pain level:  0-110     SUBJECTIVE:  Reports staying an extra week in [place]. Got back yesterday. No issues in general. Reports riding outdoor bike in [place]. Leaving for [facility] February. Patient 11 weeks post-op tomorrow.   OBJECTIVE:  Neel Acevedo performed  on 22 due to patient out [facility]. 1/11/22 ( 8 weeks post-op) .  Observation:    Test measurements: Knee extension 0 . , 125 AAROM with heel slide. OBJECTIVE  Test used 11/19/21 12/20/21 1/3/22 1/31/22   Pain Summary 0-10 3-4/10 2-3/10 1-2/10 0-1/10   Functional questionnaire LEFS 100% 46%  14%   Functional Testing         Knee flex 78 110 125 with HS and strap 125 with HS and strap   ROM Knee ext -3 -2 0  -1    Girth MP   47.5cm 42.5 cm   Strength Knee Ext HHD NT 42 lbs 49 lbs 45lbs    Hip abd HHD NT 33lbs 40 lbs 38 lbs      RESTRICTIONS/PRECAUTIONS: R TKR x6-7 years ago     Exercises/Interventions: Progressed HEP. Therapeutic Ex (65313) Sets/sec Reps Notes/CUES   QS, GS AP  10 reps HEP   Heelslides in sitting and supine with strap H10 10 reps  deg HEP   Hip flexor stretches off EOB with strap + to HEP   SLR with min assist HEP   Hip abduction HEP   Sitting HS stretches HEP   Sitting GS  Stretches with strap H30 5 HEP   Prone knee ext hang off table  5'    Prone TKE with bolster  H5 2x15 reps          EZ chair for flexion 5'  122 on remote and 130 with goniometer. 0 on remote with extension at  Second notch   Bike for ROM #6 seat 6'  .  Full rev   Zero knee prop with QS and GS stretches with strap    Incline stretches H30 5x    Bridges with GTB  HEP   Side stepping with Big RVL 1 lap    SL clams with GTB  HEP   Side stepping with LVL/monster walks   + to HEP   SB wall squats    Standing HR  Manual Intervention (38277)      Knee ext O.P  3 reps    Patellar mobs 5'                             NMR re-education (61567)   CUES NEEDED         Patient education/HEP/ gait and stair training 5'           MH hip abd 50#/Hip flexion 50# H5 2x15 reps      #/eccentrics 100#  H5 2x15 reps MH TKE 60#    CC retro walking 20#/side stepping 15# with SLS Toe-heel Step to Airex/ lateral step -to airex x5 reps each    True stretch HS 3D    Therapeutic Activity (80112)            FSU 4\"      Hip hiking 4\"  2x10 reps    FSU up and over 8\"  1x10 repsSL stance floor/airex    LSD 4\"  2x15 reps               HEP instruction: (see scanned forms)  Access Code: [email or web address]  URL: [email or web address]  Date: 11/19/2021  Prepared by: [place] Lax     Exercises  Long Sitting Ankle Pumps - 5-7 x daily - 7 x weekly - 3 sets - 10 reps - 1-2 hold  Long Sitting Quad Set - 5-7 x daily - 7 x weekly - 1 sets - 10 reps - 10 hold  Long Sitting Calf Stretch with Strap - 2-3 x daily - 7 x weekly - 1 sets - 5 reps - 30\" hold  Seated Table Hamstring Stretch - 2-3 x daily - 7 x weekly - 1 sets - 5 reps - 30\" hold  Supine Straight Leg Raises - 2-3 x daily - 7 x weekly - 2 sets - 10 reps - 5 hold  Side Leg Lifts - 2-3 x daily - 7 x weekly - 2 sets - 10 reps - 5\" hold  Sitting Heel Slide with Towel - 2-3 x daily - 7 x weekly - 1 sets - 10 reps - 10\" hold  Supine Heel Slide with Strap - 2-3 x daily - 7 x weekly - 3 sets - 10 reps - 10\" hold     Access Code: [email or web address]  URL: [employer]/  Date: 11/24/2021  Prepared by: Liza Lax    Exercises  Supine Bridge with Resistance Band - 2 x daily - 7 x weekly - 3 sets - 10 reps - 5 hold  Clamshell with Resistance - 2 x daily - 7 x weekly - 3 sets - 10 reps - 5 hold    Therapeutic Exercise and NMR EXR  [x] (15276) Provided verbal/tactile cueing for activities related to strengthening, flexibility, endurance, ROM for improvements in LE, proximal hip, and core control with self care, mobility, lifting, ambulation.  [] (49125) Provided verbal/tactile cueing for activities related to improving balance, coordination, kinesthetic sense, posture, motor skill, proprioception  to assist with LE, proximal hip, and core control in self care, mobility, lifting, ambulation and eccentric single leg control.      NMR and Therapeutic Activities:    [x] (54826 or 65300) Provided verbal/tactile cueing for activities related to improving balance, coordination, kinesthetic sense, posture, motor skill, proprioception and motor activation to allow for proper function of core, proximal hip and LE with self care and ADLs  [] (35308) Gait Re-education- Provided training and instruction to the patient for proper LE, core and proximal hip recruitment and positioning and eccentric body weight control with ambulation re-education including up and down stairs     Home Exercise Program:    [x] (43426) Reviewed/Progressed HEP activities related to strengthening, flexibility, endurance, ROM of core, proximal hip and LE for functional self-care, mobility, lifting and ambulation/stair navigation   [] (74081)Reviewed/Progressed HEP activities related to improving balance, coordination, kinesthetic sense, posture, motor skill, proprioception of core, proximal hip and LE for self care, mobility, lifting, and ambulation/stair navigation      Manual Treatments:  PROM / STM / Oscillations-Mobs:  G-I, II, III, IV (PA's, Inf., Post.)  [x] (95319) Provided manual therapy to mobilize LE, proximal hip and/or LS spine soft tissue/joints for the purpose of modulating pain, promoting relaxation,  increasing ROM, reducing/eliminating soft tissue swelling/inflammation/restriction, improving soft tissue extensibility and allowing for proper ROM for normal function with self care, mobility, lifting and ambulation. Modalities:   Deferred this visit. [] GAME READY (VASO)- for significant edema, swelling, pain control. Charges  Timed Code Treatment Minutes: 54'   Total Treatment Minutes: 54'     Medicare total (add KX at $2000)  ~ 300         [] EVAL (LOW) 63866   [] EVAL (MOD) 57653  [] EVAL (HIGH) 99119   [] RE-EVAL     [x] YR(21164) x 2    [] IONTO  [x] NMR (97311) x 2    [] VASO  [] Manual (41388) x      [] Other:  [] TA x      [] Mech Traction (91320)  [] ES(attended) (58471)      [] ES (un) (57298):         GOALS:  Patient stated goal: Get rid of pain  [x]? Progressing: []? Met: []? Not Met: []? Adjusted     Therapist goals for Patient:   Short Term Goals:  To be achieved in: 2 weeks   1. Independent in HEP and progression per patient tolerance, in order to prevent re-injury. []? Progressing: [x]? Met: []? Not Met: []? Adjusted  2. Patient will have a decrease in pain to facilitate improvement in movement, function, and ADLs as indicated by Functional Deficits. []? Progressing: [x]? Met: []? Not Met: []? Adjusted     Long Term Goals: To be achieved in: 12 weeks  1. Disability index score of 45% or less for the LEFS to assist with reaching prior level of function. []? Progressing: [x]? Met: []? Not Met: []? Adjusted  2. Patient will demonstrate increased AROM to 0-130 to allow for proper joint functioning as indicated by patients Functional Deficits. [x]? Progressing: []? Met: []? Not Met: []? Adjusted  3. Patient will demonstrate an increase in Strength to good proximal hip strength and control, within 5lb HHD in LE to allow for proper functional mobility as indicated by patients Functional Deficits. [x]? Progressing: []? Met: []? Not Met: []? Adjusted  4. Patient will return to walking with normalized gait pattern with or without appropriate assistive device functional activities without increased symptoms or restriction. []? Progressing: [x]? Met: []? Not Met: []? Adjusted  5. Reciprocal gait with stairs without pain(patient specific functional goal)    []? Progressing: [x]? Met: []? Not Met: []? Adjusted         Progression Towards Functional goals:  [x] Patient is progressing as expected towards functional goals listed. [] Progression is slowed due to complexities listed. [] Progression has been slowed due to co-morbidities. [] Plan just implemented, too soon to assess goals progression  [] Other:         Overall Progression Towards Functional goals/ Treatment Progress Update:  [x] Patient is progressing as expected towards functional goals listed. [] Progression is slowed due to complexities/Impairments listed.   [] Progression has been slowed due to co-morbidities. [] Plan just implemented, too soon to assess goals progression <30days   [] Goals require adjustment due to lack of progress  [] Patient is not progressing as expected and requires additional follow up with physician  [] Other    Prognosis for POC: [x] Good [] Fair  [] Poor      Patient requires continued skilled intervention: [x] Yes  [] No    Treatment/Activity Tolerance:  [x] Patient able to complete treatment  [] Patient limited by fatigue  [] Patient limited by pain    [] Patient limited by other medical complications  [] Other:     ASSESSMENT: Patient s/p L TKR on 11/16/21. Presents with pain, limited ROM, strength and functional mobility as expected s/p post-op TKR. Increased AROM noted this visit. Patient making progress with ROM/strength. Increased ROM noted this visit. Patient did not regress with ROM when out of town but still tight with end range flex/ext. Recommend a few additional weeks of PT before D/C for ROM. Pt requires skilled intervention to restore ROM, strength, functional endurance and balance in order to perform ADLs without significant symptoms or limitations. Return to Play: (if applicable)   []  Stage 1: Intro to Strength   []  Stage 2: Return to Run and Strength   []  Stage 3: Return to Jump and Strength   []  Stage 4: Dynamic Strength and Agility   []  Stage 5: Sport Specific Training     []  Ready to Return to Play, Meets All Above Stages   []  Not Ready for Return to Sports   Comments:                               PLAN: See eval. Cont PT 2-3x/week. Progress ROM/functional mobility as tolerated. Emphasis on CKC. [x] Continue per plan of care [] Alter current plan (see comments above)  [] Plan of care initiated [] Hold pending MD visit [] Discharge      Electronically signed by:  Demetra Hudson, [address] [place] [address] Road    Note: If patient does not return for scheduled/ recommended follow up visits, this note will serve as a discharge from care along with most recent update on progress.

## 2022-02-04 ENCOUNTER — APPOINTMENT (OUTPATIENT)
Dept: PHYSICAL THERAPY | Age: 72
End: 2022-02-04
Payer: MEDICARE

## 2022-02-07 ENCOUNTER — HOSPITAL ENCOUNTER (OUTPATIENT)
Dept: PHYSICAL THERAPY | Age: 72
Setting detail: THERAPIES SERIES
Discharge: HOME | End: 2022-02-07
Payer: MEDICARE

## 2022-02-07 PROCEDURE — 97112 NEUROMUSCULAR REEDUCATION: CPT | Performed by: PHYSICAL THERAPIST

## 2022-02-07 PROCEDURE — 97110 THERAPEUTIC EXERCISES: CPT | Performed by: PHYSICAL THERAPIST

## 2022-02-07 NOTE — FLOWSHEET NOTE
[facility], [address]  Phone: [phone]  Fax [phone]    Physical Therapy Treatment Note/ Progress Report:           Date:  2022    Patient Name:  Maranda Estrada    :  1950  MRN: [id number]  Restrictions/Precautions:  R TKR x 6 years ago. Medical/Treatment Diagnosis Information:  · Diagnosis: M17.12 (ICD-10-CM) - Primary osteoarthritis of left knee s/p L TKR on 21  · Treatment Diagnosis: M17.12 (ICD-10-CM) - Primary osteoarthritis of left knee s/p L TKR on 21, L knee pain W10.777  Insurance/Certification information:  PT Insurance Information: /  Physician Information:  Referring Practitioner: Dr. Ceci Benitez  Has the plan of care been signed (Y/N):        []  Yes  [x]  No     Date of Patient follow up with Physician:  None scheduled. Is this a Progress Report:     []  Yes  [x]  No        If Yes:  Date Range for reporting period:  Beginning; 22  Ending; 22    Progress report will be due (10 Rx or 30 days whichever is less):       Recertification will be due (POC Duration  / 90 days whichever is less): 12 weeks 22      Visit # Insurance Allowable Auth Required   In-person 17 total  2022 / []  Yes ? []  No    Telehealth   []  Yes []  No    Total          Functional Scale: LEFS: 0/80 100%, 43/80 46%,  69/80 14%     Date assessed: 21, 21, 22      Therapy Diagnosis/Practice Pattern: 4H      Number of Comorbidities:  []0     [x]1-2    []3    Latex Allergy:  [x]NO      []YES  Preferred Language for Healthcare:   [x]English       []other:      Pain level:  0-1/10     SUBJECTIVE:  Reports no new issues from last visit. OBJECTIVE:  Nola La performed  on 22 due to patient out of town. 22 ( 8 weeks post-op) . Patient interested in [address] at D/C.  Observation:    Test measurements: Knee extension 0 . , 125 AAROM with heel slide. OBJECTIVE  Test used 11/19/21 12/20/21 1/3/22 1/31/22   Pain Summary 0-10 3-4/10 2-3/10 1-2/10 0-1/10   Functional questionnaire LEFS 100% 46%  14%   Functional Testing         Knee flex 78 110 125 with HS and strap 125 with HS and strap   ROM Knee ext -3 -2 0  -1    Girth MP   47.5cm 42.5 cm   Strength Knee Ext HHD NT 42 lbs 49 lbs 45lbs    Hip abd HHD NT 33lbs 40 lbs 38 lbs      RESTRICTIONS/PRECAUTIONS: R TKR x6-7 years ago     Exercises/Interventions: Progressed HEP. Therapeutic Ex (12686) Sets/sec Reps Notes/CUES   QS, GS AP  10 reps HEP   Heelslides in sitting and supine with strap H10 10 reps  deg HEP   Hip flexor stretches off EOB with strap + to HEP   SLR with min assist HEP   Hip abduction HEP   Sitting HS stretches HEP   Sitting GS  Stretches with strap H30 5 HEP   Prone knee ext hang off table  5'    Prone TKE with bolster  H5 2x15 reps          EZ chair for flexion 5'  125 on remote and 130 with goniometer. 0 on remote with extension at  Second notch   Bike for ROM #6 seat 6'  .  Full rev   Zero knee prop with QS and GS stretches with strap    Incline stretches H30 5x    Bridges with GTB  HEP   Side stepping with Big RVL 2 lap    SL clams with GTB  HEP   Side stepping with LVL/monster walks   + to HEP   SB wall squats    BOSU squats at bar H5 2x10 repsGliders with LVL lat/PL/post H5 2x10 repsStanding HR  Manual Intervention (73811)      Knee ext O.P  3 reps    Patellar mobs 5'                             NMR re-education (59346)   CUES NEEDED         Patient education/HEP/ gait and stair training 5'           MH hip abd 50#/Hip flexion 50# H5 2x15 reps      #/eccentrics 100#  H5 2x15 reps MH TKE 60#    CC retro walking 20#/side stepping 15# with SLS Toe-heel Step to Airex/ lateral step -to airex    True stretch HS 3D    Therapeutic Activity (01498)      Ladder drills  N.V     Mamanishae Maddi N.V     FSU 4\"      Hip hiking 4\"  2x10 reps    FSU up and over 8\"  1x10 repsSL stance floor/airex    LSD 4\"  2x15 reps               HEP instruction: (see scanned forms)  Access Code: [email or web address]  URL: [email or web address]  Date: 11/19/2021  Prepared by: Willis Bates     Exercises  Long Sitting Ankle Pumps - 5-7 x daily - 7 x weekly - 3 sets - 10 reps - 1-2 hold  Long Sitting Quad Set - 5-7 x daily - 7 x weekly - 1 sets - 10 reps - 10 hold  Long Sitting Calf Stretch with Strap - 2-3 x daily - 7 x weekly - 1 sets - 5 reps - 30\" hold  Seated Table Hamstring Stretch - 2-3 x daily - 7 x weekly - 1 sets - 5 reps - 30\" hold  Supine Straight Leg Raises - 2-3 x daily - 7 x weekly - 2 sets - 10 reps - 5 hold  Side Leg Lifts - 2-3 x daily - 7 x weekly - 2 sets - 10 reps - 5\" hold  Sitting Heel Slide with Towel - 2-3 x daily - 7 x weekly - 1 sets - 10 reps - 10\" hold  Supine Heel Slide with Strap - 2-3 x daily - 7 x weekly - 3 sets - 10 reps - 10\" hold     Access Code: [email or web address]  URL: [email or web address]  Date: 11/24/2021  Prepared by: Willis Bates    Exercises  Supine Bridge with Resistance Band - 2 x daily - 7 x weekly - 3 sets - 10 reps - 5 hold  Clamshell with Resistance - 2 x daily - 7 x weekly - 3 sets - 10 reps - 5 hold    Therapeutic Exercise and NMR EXR  [x] (99313) Provided verbal/tactile cueing for activities related to strengthening, flexibility, endurance, ROM for improvements in LE, proximal hip, and core control with self care, mobility, lifting, ambulation.  [] (81332) Provided verbal/tactile cueing for activities related to improving balance, coordination, kinesthetic sense, posture, motor skill, proprioception  to assist with LE, proximal hip, and core control in self care, mobility, lifting, ambulation and eccentric single leg control.      NMR and Therapeutic Activities:    [x] (84465 or 92698) Provided verbal/tactile cueing for activities related to improving balance, coordination, kinesthetic sense, posture, motor skill, proprioception and motor activation to allow for proper function of core, proximal hip and LE with self care and ADLs  [] (50401) Gait Re-education- Provided training and instruction to the patient for proper LE, core and proximal hip recruitment and positioning and eccentric body weight control with ambulation re-education including up and down stairs     Home Exercise Program:    [x] (38437) Reviewed/Progressed HEP activities related to strengthening, flexibility, endurance, ROM of core, proximal hip and LE for functional self-care, mobility, lifting and ambulation/stair navigation   [] (36273)Reviewed/Progressed HEP activities related to improving balance, coordination, kinesthetic sense, posture, motor skill, proprioception of core, proximal hip and LE for self care, mobility, lifting, and ambulation/stair navigation      Manual Treatments:  PROM / STM / Oscillations-Mobs:  G-I, II, III, IV (PA's, Inf., Post.)  [x] (17839) Provided manual therapy to mobilize LE, proximal hip and/or LS spine soft tissue/joints for the purpose of modulating pain, promoting relaxation,  increasing ROM, reducing/eliminating soft tissue swelling/inflammation/restriction, improving soft tissue extensibility and allowing for proper ROM for normal function with self care, mobility, lifting and ambulation. Modalities:   Deferred this visit. [] GAME READY (VASO)- for significant edema, swelling, pain control. Charges  Timed Code Treatment Minutes: 54'   Total Treatment Minutes: 54'     Medicare total (add KX at $2000)  ~ 500         [] EVAL (LOW) 53804   [] EVAL (MOD) 60006  [] EVAL (HIGH) 26311   [] RE-EVAL     [x] QS(06898) x 2    [] IONTO  [x] NMR (86663) x 2    [] VASO  [] Manual (03753) x      [] Other:  [] TA x      [] Mech Traction (09824)  [] ES(attended) (70624)      [] ES (un) (97352):         GOALS:  Patient stated goal: Get rid of pain  []? Progressing: [x]? Met: []? Not Met: []? Adjusted     Therapist goals for Patient:   Short Term Goals:  To be achieved in: 2 weeks   1. Independent in HEP and progression per patient tolerance, in order to prevent re-injury. []? Progressing: [x]? Met: []? Not Met: []? Adjusted  2. Patient will have a decrease in pain to facilitate improvement in movement, function, and ADLs as indicated by Functional Deficits. []? Progressing: [x]? Met: []? Not Met: []? Adjusted     Long Term Goals: To be achieved in: 12 weeks  1. Disability index score of 45% or less for the LEFS to assist with reaching prior level of function. []? Progressing: [x]? Met: []? Not Met: []? Adjusted  2. Patient will demonstrate increased AROM to 0-130 to allow for proper joint functioning as indicated by patients Functional Deficits. [x]? Progressing: []? Met: []? Not Met: []? Adjusted  3. Patient will demonstrate an increase in Strength to good proximal hip strength and control, within 5lb HHD in LE to allow for proper functional mobility as indicated by patients Functional Deficits. [x]? Progressing: []? Met: []? Not Met: []? Adjusted  4. Patient will return to walking with normalized gait pattern with or without appropriate assistive device functional activities without increased symptoms or restriction. []? Progressing: [x]? Met: []? Not Met: []? Adjusted  5. Reciprocal gait with stairs without pain(patient specific functional goal)    []? Progressing: [x]? Met: []? Not Met: []? Adjusted         Progression Towards Functional goals:  [x] Patient is progressing as expected towards functional goals listed. [] Progression is slowed due to complexities listed. [] Progression has been slowed due to co-morbidities. [] Plan just implemented, too soon to assess goals progression  [] Other:         Overall Progression Towards Functional goals/ Treatment Progress Update:  [x] Patient is progressing as expected towards functional goals listed. [] Progression is slowed due to complexities/Impairments listed.   [] Progression has been slowed due to co-morbidities. [] Plan just implemented, too soon to assess goals progression <30days   [] Goals require adjustment due to lack of progress  [] Patient is not progressing as expected and requires additional follow up with physician  [] Other    Prognosis for POC: [x] Good [] Fair  [] Poor      Patient requires continued skilled intervention: [x] Yes  [] No    Treatment/Activity Tolerance:  [x] Patient able to complete treatment  [] Patient limited by fatigue  [] Patient limited by pain    [] Patient limited by other medical complications  [] Other:     ASSESSMENT: Patient s/p L TKR on 11/16/21. Presents with pain, limited ROM, strength and functional mobility as expected s/p post-op TKR. Increased AROM noted this visit. Patient making progress with ROM/strength. Patient able to achieve 125 on EZchair this visit for flexion and 0 for extension. Recommend a few additional weeks of PT before D/C for ROM. Pt requires skilled intervention to restore ROM, strength, functional endurance and balance in order to perform ADLs without significant symptoms or limitations. Return to Play: (if applicable)   []  Stage 1: Intro to Strength   []  Stage 2: Return to Run and Strength   []  Stage 3: Return to Jump and Strength   []  Stage 4: Dynamic Strength and Agility   []  Stage 5: Sport Specific Training     []  Ready to Return to Play, Meets All Above Stages   []  Not Ready for Return to Sports   Comments:                               PLAN: See eval. Cont PT 2-3x/week. Progress ROM/functional mobility as tolerated. Emphasis on CKC. [x] Continue per plan of care [] Alter current plan (see comments above)  [] Plan of care initiated [] Hold pending MD visit [] Discharge      Electronically signed by:  Faviola Dougherty, [address]    Note: If patient does not return for scheduled/ recommended follow up visits, this note will serve as a discharge from care along with most recent update on progress.

## 2022-02-09 ENCOUNTER — HOSPITAL ENCOUNTER (OUTPATIENT)
Dept: PHYSICAL THERAPY | Age: 72
Setting detail: THERAPIES SERIES
Discharge: HOME | End: 2022-02-09
Payer: MEDICARE

## 2022-02-09 PROCEDURE — 97110 THERAPEUTIC EXERCISES: CPT | Performed by: PHYSICAL THERAPIST

## 2022-02-09 PROCEDURE — 97112 NEUROMUSCULAR REEDUCATION: CPT | Performed by: PHYSICAL THERAPIST

## 2022-02-09 NOTE — FLOWSHEET NOTE
2 laps each    Rebounder feet together and SL yellow ball  x10 each    FSU 4\"      Hip hiking 4\"  2x10 reps    FSU up and over 8\"  1x10 repsSL stance floor/airex    LSD 4\"                 HEP instruction: (see scanned forms)  Access Code: [email or web address]  URL: [email or web address]  Date: 11/19/2021  Prepared by: Barniurkaa [place]     Exercises  Long Sitting Ankle Pumps - 5-7 x daily - 7 x weekly - 3 sets - 10 reps - 1-2 hold  Long Sitting Quad Set - 5-7 x daily - 7 x weekly - 1 sets - 10 reps - 10 hold  Long Sitting Calf Stretch with Strap - 2-3 x daily - 7 x weekly - 1 sets - 5 reps - 30\" hold  Seated Table Hamstring Stretch - 2-3 x daily - 7 x weekly - 1 sets - 5 reps - 30\" hold  Supine Straight Leg Raises - 2-3 x daily - 7 x weekly - 2 sets - 10 reps - 5 hold  Side Leg Lifts - 2-3 x daily - 7 x weekly - 2 sets - 10 reps - 5\" hold  Sitting Heel Slide with Towel - 2-3 x daily - 7 x weekly - 1 sets - 10 reps - 10\" hold  Supine Heel Slide with Strap - 2-3 x daily - 7 x weekly - 3 sets - 10 reps - 10\" hold     Access Code: [email or web address]  URL: [email or web address]  Date: 11/24/2021  Prepared by: Barniurkaa [place]    Exercises  Supine Bridge with Resistance Band - 2 x daily - 7 x weekly - 3 sets - 10 reps - 5 hold  Clamshell with Resistance - 2 x daily - 7 x weekly - 3 sets - 10 reps - 5 hold    Therapeutic Exercise and NMR EXR  [x] (17986) Provided verbal/tactile cueing for activities related to strengthening, flexibility, endurance, ROM for improvements in LE, proximal hip, and core control with self care, mobility, lifting, ambulation.  [] (55473) Provided verbal/tactile cueing for activities related to improving balance, coordination, kinesthetic sense, posture, motor skill, proprioception  to assist with LE, proximal hip, and core control in self care, mobility, lifting, ambulation and eccentric single leg control.      NMR and Therapeutic Activities:    [x] (51633 or 91539) Provided verbal/tactile cueing for activities pain  []? Progressing: [x]? Met: []? Not Met: []? Adjusted     Therapist goals for Patient:   Short Term Goals: To be achieved in: 2 weeks   1. Independent in HEP and progression per patient tolerance, in order to prevent re-injury. []? Progressing: [x]? Met: []? Not Met: []? Adjusted  2. Patient will have a decrease in pain to facilitate improvement in movement, function, and ADLs as indicated by Functional Deficits. []? Progressing: [x]? Met: []? Not Met: []? Adjusted     Long Term Goals: To be achieved in: 12 weeks  1. Disability index score of 45% or less for the LEFS to assist with reaching prior level of function. []? Progressing: [x]? Met: []? Not Met: []? Adjusted  2. Patient will demonstrate increased AROM to 0-130 to allow for proper joint functioning as indicated by patients Functional Deficits. [x]? Progressing: []? Met: []? Not Met: []? Adjusted  3. Patient will demonstrate an increase in Strength to good proximal hip strength and control, within 5lb HHD in LE to allow for proper functional mobility as indicated by patients Functional Deficits. [x]? Progressing: []? Met: []? Not Met: []? Adjusted  4. Patient will return to walking with normalized gait pattern with or without appropriate assistive device functional activities without increased symptoms or restriction. []? Progressing: [x]? Met: []? Not Met: []? Adjusted  5. Reciprocal gait with stairs without pain(patient specific functional goal)    []? Progressing: [x]? Met: []? Not Met: []? Adjusted         Progression Towards Functional goals:  [x] Patient is progressing as expected towards functional goals listed. [] Progression is slowed due to complexities listed. [] Progression has been slowed due to co-morbidities.   [] Plan just implemented, too soon to assess goals progression  [] Other:         Overall Progression Towards Functional goals/ Treatment Progress Update:  [x] Patient is progressing as expected towards functional goals listed. [] Progression is slowed due to complexities/Impairments listed. [] Progression has been slowed due to co-morbidities. [] Plan just implemented, too soon to assess goals progression <30days   [] Goals require adjustment due to lack of progress  [] Patient is not progressing as expected and requires additional follow up with physician  [] Other    Prognosis for POC: [x] Good [] Fair  [] Poor      Patient requires continued skilled intervention: [x] Yes  [] No    Treatment/Activity Tolerance:  [x] Patient able to complete treatment  [] Patient limited by fatigue  [] Patient limited by pain    [] Patient limited by other medical complications  [] Other:     ASSESSMENT: Patient s/p L TKR on 11/16/21. Presents with pain, limited ROM, strength and functional mobility as expected s/p post-op TKR. Increased AROM noted this visit. Patient making progress with ROM/strength. Patient able to achieve 125 on EZchair this visit for flexion and 0 for extension. Recommend a few additional weeks of PT before D/C for ROM. Pt requires skilled intervention to restore ROM, strength, functional endurance and balance in order to perform ADLs without significant symptoms or limitations. Return to Play: (if applicable)   []  Stage 1: Intro to Strength   []  Stage 2: Return to Run and Strength   []  Stage 3: Return to Jump and Strength   []  Stage 4: Dynamic Strength and Agility   []  Stage 5: Sport Specific Training     []  Ready to Return to Play, Meets All Above Stages   []  Not Ready for Return to Sports   Comments:                               PLAN: See eval. Cont PT 2-3x/week. Progress ROM/functional mobility as tolerated. Emphasis on CKC.   [x] Continue per plan of care [] Alter current plan (see comments above)  [] Plan of care initiated [] Hold pending MD visit [] Discharge      Electronically signed by:  [employer], [address]    Note: If patient does not return for scheduled/ recommended follow up visits, this note will serve as a discharge from care along with most recent update on progress.

## 2022-02-14 ENCOUNTER — HOSPITAL ENCOUNTER (OUTPATIENT)
Dept: PHYSICAL THERAPY | Age: 72
Setting detail: THERAPIES SERIES
Discharge: HOME | End: 2022-02-14
Payer: MEDICARE

## 2022-02-14 PROCEDURE — 97112 NEUROMUSCULAR REEDUCATION: CPT | Performed by: PHYSICAL THERAPIST

## 2022-02-14 PROCEDURE — 97110 THERAPEUTIC EXERCISES: CPT | Performed by: PHYSICAL THERAPIST

## 2022-02-14 NOTE — FLOWSHEET NOTE
[facility],  [address]  Phone: [phone]  Fax [phone]    Physical Therapy Treatment Note/ Progress Report:           Date:  2022    Patient Name:  Bernice Lennox    :  1950  MRN: [id number]  Restrictions/Precautions:  R TKR x 6 years ago. Medical/Treatment Diagnosis Information:  · Diagnosis: M17.12 (ICD-10-CM) - Primary osteoarthritis of left knee s/p L TKR on 21  · Treatment Diagnosis: M17.12 (ICD-10-CM) - Primary osteoarthritis of left knee s/p L TKR on 21, L knee pain A25.730  Insurance/Certification information:  PT Insurance Information: /  Physician Information:  Referring Practitioner: Dr. Radha Barnes  Has the plan of care been signed (Y/N):        []  Yes  [x]  No     Date of Patient follow up with Physician:  None scheduled. Is this a Progress Report: D/C N.V.     []  Yes  [x]  No        If Yes:  Date Range for reporting period:  Beginning; 22  Ending; 22    Progress report will be due (10 Rx or 30 days whichever is less): 60      Recertification will be due (POC Duration  / 90 days whichever is less): 12 weeks 22      Visit # Insurance Allowable Auth Required   In-person 19 total  2022 / []  Yes ? []  No    Telehealth   []  Yes []  No    Total          Functional Scale: LEFS: 0/80 100%, 43/80 46%,  69/80 14%     Date assessed: 21, 21, 22      Therapy Diagnosis/Practice Pattern: 4H      Number of Comorbidities:  []0     [x]1-2    []3    Latex Allergy:  [x]NO      []YES  Preferred Language for Healthcare:   [x]English       []other:      Pain level:  0-1/10     SUBJECTIVE:  Reports no new issues from last visit. Stiffness in general.   OBJECTIVE:  Orthovitals performed  on 22 due to patient out of town. 22 ( 8 weeks post-op) . Patient interested in [address] [place] [place] [address] at D/C.    Observation:    Test measurements: Knee extension 0 . , 125 AAROM with heel slide. OBJECTIVE  Test used 11/19/21 12/20/21 1/3/22 1/31/22   Pain Summary 0-10 3-4/10 2-3/10 1-2/10 0-1/10   Functional questionnaire LEFS 100% 46%  14%   Functional Testing         Knee flex 78 110 125 with HS and strap 125 with HS and strap   ROM Knee ext -3 -2 0  -1    Girth MP   47.5cm 42.5 cm   Strength Knee Ext HHD NT 42 lbs 49 lbs 45lbs    Hip abd HHD NT 33lbs 40 lbs 38 lbs      RESTRICTIONS/PRECAUTIONS: R TKR x6-7 years ago     Exercises/Interventions: Progressed HEP. Therapeutic Ex (59869) Sets/sec Reps Notes/CUES   QS, GS AP   HEP   Heelslides in sitting and supine with strap H10 10 reps  deg HEP   Hip flexor stretches off EOB with strap + to HEP   SLR with min assist HEP   Hip abduction HEP   Sitting HS stretches HEP   Sitting GS  Stretches with strap H30 5 HEP   Prone knee ext hang off table     Prone TKE with bolster           EZ chair for flexion 5'  125 on remote and 130 with goniometer. 0 on remote with extension at  Second notch   Bike for ROM #6 seat 6'  .  Full rev   Zero knee prop with QS and GS stretches with strap    Incline stretches H30 5x    Bridges with GTB  HEP   Side stepping with Big RVL 2 lap    SL clams with GTB  HEP   Side stepping with LVL/monster walks   + to HEP   SB wall squats    BOSU squats at bar Gliders with LVL lat/PL/post Anterior and lateral lunges h5 2x10 repsStanding HR  Manual Intervention (90153)      Knee ext O.P  3 reps    Patellar mobs                              NMR re-education (67461)   CUES NEEDED         Patient education/HEP/ gait and stair training 5'           MH hip abd 60#/Hip flexion 60# H5 2x15 reps      #/eccentrics 100# /unilateral 60#  H5 2x15 reps MH TKE 60#    CC retro walking 20#/side stepping 15# with SLS  Toe-heel   Step to Airex/ lateral step -to airex    True stretch HS 3D H15 3 reps    Therapeutic Activity (88835)      Ladder drills frwd/lateral/ 2 in /out  2 laps each Carioca     Rebounder feet together and SL yellow ball  x10 each    FSU 4\"      Hip hiking 4\"  2x10 reps    FSU up and over 8\"  SL stance floor/airex    LSD 4\"  2x15 reps               HEP instruction: (see scanned forms)  Access Code: [email or web address]  URL: [email or web address]  Date: 11/19/2021  Prepared by: Dedrickell Snow     Exercises  Long Sitting Ankle Pumps - 5-7 x daily - 7 x weekly - 3 sets - 10 reps - 1-2 hold  Long Sitting Quad Set - 5-7 x daily - 7 x weekly - 1 sets - 10 reps - 10 hold  Long Sitting Calf Stretch with Strap - 2-3 x daily - 7 x weekly - 1 sets - 5 reps - 30\" hold  Seated Table Hamstring Stretch - 2-3 x daily - 7 x weekly - 1 sets - 5 reps - 30\" hold  Supine Straight Leg Raises - 2-3 x daily - 7 x weekly - 2 sets - 10 reps - 5 hold  Side Leg Lifts - 2-3 x daily - 7 x weekly - 2 sets - 10 reps - 5\" hold  Sitting Heel Slide with Towel - 2-3 x daily - 7 x weekly - 1 sets - 10 reps - 10\" hold  Supine Heel Slide with Strap - 2-3 x daily - 7 x weekly - 3 sets - 10 reps - 10\" hold     Access Code: [email or web address]  URL: [email or web address]  Date: 11/24/2021  Prepared by: Gabriela Perry    Exercises  Supine Bridge with Resistance Band - 2 x daily - 7 x weekly - 3 sets - 10 reps - 5 hold  Clamshell with Resistance - 2 x daily - 7 x weekly - 3 sets - 10 reps - 5 hold    Therapeutic Exercise and NMR EXR  [x] (09464) Provided verbal/tactile cueing for activities related to strengthening, flexibility, endurance, ROM for improvements in LE, proximal hip, and core control with self care, mobility, lifting, ambulation.  [] (85951) Provided verbal/tactile cueing for activities related to improving balance, coordination, kinesthetic sense, posture, motor skill, proprioception  to assist with LE, proximal hip, and core control in self care, mobility, lifting, ambulation and eccentric single leg control.      NMR and Therapeutic Activities:    [x] (55241 or 52829) Provided verbal/tactile cueing for activities related to improving balance, coordination, kinesthetic sense, posture, motor skill, proprioception and motor activation to allow for proper function of core, proximal hip and LE with self care and ADLs  [] (38474) Gait Re-education- Provided training and instruction to the patient for proper LE, core and proximal hip recruitment and positioning and eccentric body weight control with ambulation re-education including up and down stairs     Home Exercise Program:    [x] (91376) Reviewed/Progressed HEP activities related to strengthening, flexibility, endurance, ROM of core, proximal hip and LE for functional self-care, mobility, lifting and ambulation/stair navigation   [] (31549)Reviewed/Progressed HEP activities related to improving balance, coordination, kinesthetic sense, posture, motor skill, proprioception of core, proximal hip and LE for self care, mobility, lifting, and ambulation/stair navigation      Manual Treatments:  PROM / STM / Oscillations-Mobs:  G-I, II, III, IV (PA's, Inf., Post.)  [x] (56272) Provided manual therapy to mobilize LE, proximal hip and/or LS spine soft tissue/joints for the purpose of modulating pain, promoting relaxation,  increasing ROM, reducing/eliminating soft tissue swelling/inflammation/restriction, improving soft tissue extensibility and allowing for proper ROM for normal function with self care, mobility, lifting and ambulation. Modalities:   Deferred this visit. [] GAME READY (VASO)- for significant edema, swelling, pain control.      Charges  Timed Code Treatment Minutes: 54'   Total Treatment Minutes: 54'     Medicare total (add KX at $2000)  ~ 700         [] EVAL (LOW) 09350   [] EVAL (MOD) 65672  [] EVAL (HIGH) 67466   [] RE-EVAL     [x] LH(56633) x 2    [] IONTO  [x] NMR (99501) x 2    [] VASO  [] Manual (90393) x      [] Other:  [] TA x      [] Mech Traction (43738)  [] ES(attended) (64447)      [] ES (un) (79346):         GOALS:  Patient stated goal: Get rid of pain  []? Progressing: [x]? Met: []? Not Met: []? Adjusted     Therapist goals for Patient:   Short Term Goals: To be achieved in: 2 weeks   1. Independent in HEP and progression per patient tolerance, in order to prevent re-injury. []? Progressing: [x]? Met: []? Not Met: []? Adjusted  2. Patient will have a decrease in pain to facilitate improvement in movement, function, and ADLs as indicated by Functional Deficits. []? Progressing: [x]? Met: []? Not Met: []? Adjusted     Long Term Goals: To be achieved in: 12 weeks  1. Disability index score of 45% or less for the LEFS to assist with reaching prior level of function. []? Progressing: [x]? Met: []? Not Met: []? Adjusted  2. Patient will demonstrate increased AROM to 0-130 to allow for proper joint functioning as indicated by patients Functional Deficits. [x]? Progressing: []? Met: []? Not Met: []? Adjusted  3. Patient will demonstrate an increase in Strength to good proximal hip strength and control, within 5lb HHD in LE to allow for proper functional mobility as indicated by patients Functional Deficits. [x]? Progressing: []? Met: []? Not Met: []? Adjusted  4. Patient will return to walking with normalized gait pattern with or without appropriate assistive device functional activities without increased symptoms or restriction. []? Progressing: [x]? Met: []? Not Met: []? Adjusted  5. Reciprocal gait with stairs without pain(patient specific functional goal)    []? Progressing: [x]? Met: []? Not Met: []? Adjusted         Progression Towards Functional goals:  [x] Patient is progressing as expected towards functional goals listed. [] Progression is slowed due to complexities listed. [] Progression has been slowed due to co-morbidities.   [] Plan just implemented, too soon to assess goals progression  [] Other:         Overall Progression Towards Functional goals/ Treatment Progress Update:  [x] Patient is progressing as expected towards functional goals listed. [] Progression is slowed due to complexities/Impairments listed. [] Progression has been slowed due to co-morbidities. [] Plan just implemented, too soon to assess goals progression <30days   [] Goals require adjustment due to lack of progress  [] Patient is not progressing as expected and requires additional follow up with physician  [] Other    Prognosis for POC: [x] Good [] Fair  [] Poor      Patient requires continued skilled intervention: [x] Yes  [] No    Treatment/Activity Tolerance:  [x] Patient able to complete treatment  [] Patient limited by fatigue  [] Patient limited by pain    [] Patient limited by other medical complications  [] Other:     ASSESSMENT: Patient s/p L TKR on 11/16/21. Presents with pain, limited ROM, strength and functional mobility as expected s/p post-op TKR. Increased AROM noted this visit. Patient making progress with ROM/strength. Patient able to achieve 125 on EZchair this visit for flexion and 0 for extension. Pt requires skilled intervention to restore ROM, strength, functional endurance and balance in order to perform ADLs without significant symptoms or limitations. Return to Play: (if applicable)   []  Stage 1: Intro to Strength   []  Stage 2: Return to Run and Strength   []  Stage 3: Return to Jump and Strength   []  Stage 4: Dynamic Strength and Agility   []  Stage 5: Sport Specific Training     []  Ready to Return to Play, Meets All Above Stages   []  Not Ready for Return to Sports   Comments:                               PLAN: See eval. Cont PT 2-3x/week. Progress ROM/functional mobility as tolerated. Emphasis on CKC.   [x] Continue per plan of care [] Alter current plan (see comments above)  [] Plan of care initiated [] Hold pending MD visit [] Discharge      Electronically signed by:  Chula Ferguson, [address]     Note: If patient does not return for scheduled/ recommended follow up visits, this note will serve as a discharge from care along with most recent update on progress.

## 2022-02-16 ENCOUNTER — HOSPITAL ENCOUNTER (OUTPATIENT)
Dept: PHYSICAL THERAPY | Age: 72
Setting detail: THERAPIES SERIES
Discharge: HOME | End: 2022-02-16
Payer: MEDICARE

## 2022-02-16 PROCEDURE — 97140 MANUAL THERAPY 1/> REGIONS: CPT | Performed by: PHYSICAL THERAPIST

## 2022-02-16 PROCEDURE — 97110 THERAPEUTIC EXERCISES: CPT | Performed by: PHYSICAL THERAPIST

## 2022-02-16 PROCEDURE — 97112 NEUROMUSCULAR REEDUCATION: CPT | Performed by: PHYSICAL THERAPIST

## 2022-02-16 PROCEDURE — 97033 APP MDLTY 1+IONTPHRSIS EA 15: CPT | Performed by: PHYSICAL THERAPIST

## 2022-02-16 PROCEDURE — 97016 VASOPNEUMATIC DEVICE THERAPY: CPT | Performed by: PHYSICAL THERAPIST

## 2022-02-16 NOTE — DISCHARGE SUMMARY
[facility],  [address]  Phone: [phone]  Fax [phone]    Physical Therapy Treatment Note/ Progress Report:           Date:  2022    Patient Name:  Randi Robles    :  1950  MRN: [id number]  Restrictions/Precautions:  R TKR x 6 years ago. Medical/Treatment Diagnosis Information:  · Diagnosis: M17.12 (ICD-10-CM) - Primary osteoarthritis of left knee s/p L TKR on 21  · Treatment Diagnosis: M17.12 (ICD-10-CM) - Primary osteoarthritis of left knee s/p L TKR on 21, L knee pain S70.617  Insurance/Certification information:  PT Insurance Information: /  Physician Information:  Referring Practitioner: Dr. Aime Salazar  Has the plan of care been signed (Y/N):        []  Yes  [x]  No     Date of Patient follow up with Physician:  None scheduled. Is this a Progress :     [x]  Yes  []  No        If Yes:  Date Range for reporting period:  Beginning; 22  Ending; 22 D/C. Progress report will be due (10 Rx or 30 days whichever is less): 1/3/76      Recertification will be due (POC Duration  / 90 days whichever is less): 12 weeks 22      Visit # Insurance Allowable Auth Required   In-person 20- total  2022 / []  Yes ? []  No    Telehealth   []  Yes []  No    Total          Functional Scale: LEFS: 0/80 100%, 43/80 46%,  69/80 14% 69/80 14%   Date assessed: 21, 21, 22, 22      Therapy Diagnosis/Practice Pattern: 4H      Number of Comorbidities:  []0     [x]1-2    []3    Latex Allergy:  [x]NO      []YES  Preferred Language for Healthcare:   [x]English       []other:      Pain level:  0-1/10     SUBJECTIVE:  Reports no new issues from last visit. Stiffness in general.   OBJECTIVE:  Orthovitals performed  on 22 for D/C status. 22 ( 8 weeks post-op) . Patient provided [address] at D/C.    Observation:    Test measurements: Knee extension 0 . , 125 AAROM with heel slide. OBJECTIVE  Test used 11/19/21 12/20/21 1/3/22 1/31/22 and 2/16/22   Pain Summary 0-10 3-4/10 2-3/10 1-2/10 0-1/10   Functional questionnaire LEFS 100% 46%  14%   Functional Testing         Knee flex 78 110 125 with HS and strap 125 with HS and strap. 130 with O.P   ROM Knee ext -3 -2 0  0    Girth MP   47.5cm 42.5 cm   Strength Knee Ext HHD NT 42 lbs 49 lbs 45lbs    Hip abd HHD NT 33lbs 40 lbs 38 lbs              RESTRICTIONS/PRECAUTIONS: R TKR x6-7 years ago     Exercises/Interventions: Progressed HEP. Therapeutic Ex (05386) Sets/sec Reps Notes/CUES   QS, GS AP   HEP   Heelslides in sitting and supine with strap H10 10 reps  deg HEP   Hip flexor stretches off EOB with strap + to HEP   SLR with min assist HEP   Hip abduction HEP   Sitting HS stretches HEP   Sitting GS  Stretches with strap H30 5 HEP   Prone knee ext hang off table     Prone TKE with [employer]           EZ chair for flexion 5'  125 on remote and 130 with goniometer. 0 on remote with extension at  Second notch   Bike for ROM #6 seat 6'  .  Full rev   Zero knee prop with QS and GS stretches with strap    Incline stretches H30 5x    Bridges with GTB  HEP   Side stepping with Big RVL 2 lap    SL clams with GTB  HEP   Side stepping with LVL/monster walks   + to HEP   SB wall squats    BOSU squats at bar Gliders with LVL lat/PL/post Anterior and lateral lunges h5 2x10 repsStanding HR  Manual Intervention (44725)      Knee ext O.P  3 reps    Patellar mobs                              NMR re-education (26898)   CUES NEEDED         Patient education/HEP/ gait and stair training 5'           MH hip abd 60#/Hip flexion 60# H5 2x15 reps      #/eccentrics 100# /unilateral 60#  H5 2x15 reps MH TKE 60#    CC retro walking 20#/side stepping 15# with SLS  Toe-heel   Step to Airex/ lateral step -to airex    True stretch HS 3D H15 3 reps    Therapeutic Activity (48811)      Ladder drills frwd/lateral/ 2 in /out  2 laps each    Carioca     Rebounder feet together and SL yellow ball  x10 each    FSU 4\"      Hip hiking 4\"  2x10 reps    FSU up and over 8\"  SL stance floor/airex    LSD 4\"  2x15 reps                   HEP instruction: (see scanned forms)  Access Code: [email or web address]  URL: [email or web address]  Date: 11/19/2021  Prepared by: Charmaine Mejia     Exercises  Long Sitting Ankle Pumps - 5-7 x daily - 7 x weekly - 3 sets - 10 reps - 1-2 hold  Long Sitting Quad Set - 5-7 x daily - 7 x weekly - 1 sets - 10 reps - 10 hold  Long Sitting Calf Stretch with Strap - 2-3 x daily - 7 x weekly - 1 sets - 5 reps - 30\" hold  Seated Table Hamstring Stretch - 2-3 x daily - 7 x weekly - 1 sets - 5 reps - 30\" hold  Supine Straight Leg Raises - 2-3 x daily - 7 x weekly - 2 sets - 10 reps - 5 hold  Side Leg Lifts - 2-3 x daily - 7 x weekly - 2 sets - 10 reps - 5\" hold  Sitting Heel Slide with Towel - 2-3 x daily - 7 x weekly - 1 sets - 10 reps - 10\" hold  Supine Heel Slide with Strap - 2-3 x daily - 7 x weekly - 3 sets - 10 reps - 10\" hold     Access Code: [email or web address]  URL: [email or web address]  Date: 11/24/2021  Prepared by: Charmaine Mejia    Exercises  Supine Bridge with Resistance Band - 2 x daily - 7 x weekly - 3 sets - 10 reps - 5 hold  Clamshell with Resistance - 2 x daily - 7 x weekly - 3 sets - 10 reps - 5 hold    Therapeutic Exercise and NMR EXR  [x] (21824) Provided verbal/tactile cueing for activities related to strengthening, flexibility, endurance, ROM for improvements in LE, proximal hip, and core control with self care, mobility, lifting, ambulation.  [] (87287) Provided verbal/tactile cueing for activities related to improving balance, coordination, kinesthetic sense, posture, motor skill, proprioception  to assist with LE, proximal hip, and core control in self care, mobility, lifting, ambulation and eccentric single leg control.      NMR and Therapeutic Activities:    [x] (37848 or 57478) Provided verbal/tactile cueing for activities related to improving balance, coordination, kinesthetic sense, posture, motor skill, proprioception and motor activation to allow for proper function of core, proximal hip and LE with self care and ADLs  [] (94375) Gait Re-education- Provided training and instruction to the patient for proper LE, core and proximal hip recruitment and positioning and eccentric body weight control with ambulation re-education including up and down stairs     Home Exercise Program:    [x] (14265) Reviewed/Progressed HEP activities related to strengthening, flexibility, endurance, ROM of core, proximal hip and LE for functional self-care, mobility, lifting and ambulation/stair navigation   [] (14540)Reviewed/Progressed HEP activities related to improving balance, coordination, kinesthetic sense, posture, motor skill, proprioception of core, proximal hip and LE for self care, mobility, lifting, and ambulation/stair navigation      Manual Treatments:  PROM / STM / Oscillations-Mobs:  G-I, II, III, IV (PA's, Inf., Post.)  [x] (72391) Provided manual therapy to mobilize LE, proximal hip and/or LS spine soft tissue/joints for the purpose of modulating pain, promoting relaxation,  increasing ROM, reducing/eliminating soft tissue swelling/inflammation/restriction, improving soft tissue extensibility and allowing for proper ROM for normal function with self care, mobility, lifting and ambulation. Modalities:  Performed game ready this visit due to soreness and patient request.   [x] GAME READY (VASO)- for significant edema, swelling, pain control.      Charges  Timed Code Treatment Minutes: 39'   Total Treatment Minutes: 61'     Medicare total (add KX at $2000)  ~ 800         [] EVAL (LOW) 37462   [] EVAL (MOD) 75363  [] EVAL (HIGH) 76104   [] RE-EVAL     [x] VR(57324) x 2    [] IONTO  [x] NMR (92925) x 1    [x] VASO  [] Manual (27913) x      [] Other:  [] TA x      [] Mech Traction (04296)  [] ES(attended) (27929) [] ES (un) (51321):         GOALS:  Patient stated goal: Get rid of pain  []? Progressing: [x]? Met: []? Not Met: []? Adjusted     Therapist goals for Patient:   Short Term Goals: To be achieved in: 2 weeks   1. Independent in HEP and progression per patient tolerance, in order to prevent re-injury. []? Progressing: [x]? Met: []? Not Met: []? Adjusted  2. Patient will have a decrease in pain to facilitate improvement in movement, function, and ADLs as indicated by Functional Deficits. []? Progressing: [x]? Met: []? Not Met: []? Adjusted     Long Term Goals: To be achieved in: 12 weeks  1. Disability index score of 45% or less for the LEFS to assist with reaching prior level of function. []? Progressing: [x]? Met: []? Not Met: []? Adjusted  2. Patient will demonstrate increased AROM to 0-130 to allow for proper joint functioning as indicated by patients Functional Deficits. []? Progressing: [x]? Met: []? Not Met: []? Adjusted  3. Patient will demonstrate an increase in Strength to good proximal hip strength and control, within 5lb HHD in LE to allow for proper functional mobility as indicated by patients Functional Deficits. []? Progressing: [x]? Met: []? Not Met: []? Adjusted  4. Patient will return to walking with normalized gait pattern with or without appropriate assistive device functional activities without increased symptoms or restriction. []? Progressing: [x]? Met: []? Not Met: []? Adjusted  5. Reciprocal gait with stairs without pain(patient specific functional goal)    []? Progressing: [x]? Met: []? Not Met: []? Adjusted         Progression Towards Functional goals:  [x] Patient is progressing as expected towards functional goals listed. [] Progression is slowed due to complexities listed. [] Progression has been slowed due to co-morbidities.   [] Plan just implemented, too soon to assess goals progression  [] Other:         Overall Progression Towards Functional goals/ Treatment Progress Update:  [x] Patient is progressing as expected towards functional goals listed. [] Progression is slowed due to complexities/Impairments listed. [] Progression has been slowed due to co-morbidities. [] Plan just implemented, too soon to assess goals progression <30days   [] Goals require adjustment due to lack of progress  [] Patient is not progressing as expected and requires additional follow up with physician  [] Other    Prognosis for POC: [x] Good [] Fair  [] Poor      Patient requires continued skilled intervention: [] Yes  [x] No    Treatment/Activity Tolerance:  [x] Patient able to complete treatment  [] Patient limited by fatigue  [] Patient limited by pain    [] Patient limited by other medical complications  [] Other:     ASSESSMENT: All goals achieved. Patient to continue with HEP and [facility] pass. Return to Play: (if applicable)   []  Stage 1: Intro to Strength   []  Stage 2: Return to Run and Strength   []  Stage 3: Return to Jump and Strength   []  Stage 4: Dynamic Strength and Agility   []  Stage 5: Sport Specific Training     []  Ready to Return to Play, Meets All Above Stages   []  Not Ready for Return to Sports   Comments:                               PLAN: D/C PT. Cont with HEP. [] Continue per plan of care [] Alter current plan (see comments above)  [] Plan of care initiated [] Hold pending MD visit [x] Discharge      Electronically signed by:  Woodrow Clayton, [address]     Note: If patient does not return for scheduled/ recommended follow up visits, this note will serve as a discharge from care along with most recent update on progress.

## (undated) DEVICE — SUTURE VCRL SZ 2-0 L18IN ABSRB UD CT-1 L36MM 1/2 CIR J839D

## (undated) DEVICE — TOWEL OR BLUEE 16X26IN ST 8 PACK ORB08 16X26ORTWL

## (undated) DEVICE — COVER LT HNDL PLAS RIG 2 PER PK

## (undated) DEVICE — BLADE, TONGUE, 6", STERILE: Brand: MEDLINE

## (undated) DEVICE — DRESSING FOAM W4XL10IN POLYUR SAFETAC MULTILAYERED ABSRB PD

## (undated) DEVICE — SUTURE VCRL SZ 0 L18IN ABSRB UD L36MM CT-1 1/2 CIR J840D

## (undated) DEVICE — 3M™ STERI-STRIP™ REINFORCED ADHESIVE SKIN CLOSURES, R1547, 1/2 IN X 4 IN (12 MM X 100 MM), 6 STRIPS/ENVELOPE: Brand: 3M™ STERI-STRIP™

## (undated) DEVICE — GAUZE,SPONGE,4"X4",16PLY,STRL,LF,10/TRAY: Brand: MEDLINE

## (undated) DEVICE — Device

## (undated) DEVICE — ALCOHOL RUBBING 16OZ 70% ISO

## (undated) DEVICE — CHLORAPREP 26ML ORANGE

## (undated) DEVICE — SUTURE VCRL + SZ 2-0 L18IN ABSRB UD CT1 L36MM 1/2 CIR VCP839D

## (undated) DEVICE — SYSTEM VAC MIX SGL DBL CLEARMIX 1 PER CA

## (undated) DEVICE — 2108 SERIES SAGITTAL BLADE (28.9 X 0.64 X 58.7MM)

## (undated) DEVICE — DRESSING,GAUZE,XEROFORM,CURAD,5"X9",ST: Brand: CURAD

## (undated) DEVICE — HEADLESS TROCHAR PIN 75MM: Brand: ZUK

## (undated) DEVICE — 2108 SERIES SAGITTAL BLADE FAN, OFFSET  (29.0 X 0.89 X 73.0MM)

## (undated) DEVICE — 35 ML SYRINGE LUER-LOCK TIP: Brand: MONOJECT

## (undated) DEVICE — Z DISCONTINUED USE 2716304 SUTURE STRATAFIX SPRL SZ 3-0 L12IN ABSRB UD FS-1 L24MM 3/8

## (undated) DEVICE — BIPOLAR SEALER 23-112-1 AQM 6.0: Brand: AQUAMANTYS ®

## (undated) DEVICE — HOOD, PEEL-AWAY: Brand: FLYTE

## (undated) DEVICE — SYSTEM SKIN CLSR 22CM DERMBND PRINEO

## (undated) DEVICE — UNIVERSAL BLOCK TRAY: Brand: MEDLINE INDUSTRIES, INC.

## (undated) DEVICE — Device: Brand: STABLECUT®

## (undated) DEVICE — DUAL CUT SAGITTAL BLADE

## (undated) DEVICE — DRAPE,U/ SHT,SPLIT,PLAS,STERIL: Brand: MEDLINE

## (undated) DEVICE — SUTURE ETHBND EXCEL SZ 0 L18IN NONABSORBABLE GRN L26MM CT-2 CX27D

## (undated) DEVICE — BLADE SAGITAL 18X90X1.27MM

## (undated) DEVICE — STERILE PVP: Brand: MEDLINE INDUSTRIES, INC.

## (undated) DEVICE — HANDPIECE SET WITH HIGH FLOW TIP AND SUCTION TUBE: Brand: INTERPULSE

## (undated) DEVICE — 10FR FRAZIER SUCTION HANDLE: Brand: CARDINAL HEALTH

## (undated) DEVICE — STERILE POLYISOPRENE POWDER-FREE SURGICAL GLOVES: Brand: PROTEXIS

## (undated) DEVICE — SUTURE VCRL + SZ 0 L18IN ABSRB UD L36MM CT-1 1/2 CIR VCP840D

## (undated) DEVICE — GAUZE SPONGES,12 PLY: Brand: CURITY

## (undated) DEVICE — PADDING CAST W4INXL4YD ST COT RAYON MICROPLEATED HIGHLY

## (undated) DEVICE — ABDOMINAL PAD: Brand: DERMACEA

## (undated) DEVICE — STAPLER SKIN CLSR S STL NONABSORBABLE WIDE FIX HD HND GRP

## (undated) DEVICE — SUTURE FIBERWIRE SZ 2 W/ TAPERED NEEDLE BLUE L38IN NONABSORB BLU L26.5MM 1/2 CIRCLE AR7200

## (undated) DEVICE — SUTURE MCRYL SZ 3-0 L27IN ABSRB UD L19MM PS-2 3/8 CIR PRIM Y427H